# Patient Record
Sex: MALE | URBAN - METROPOLITAN AREA
[De-identification: names, ages, dates, MRNs, and addresses within clinical notes are randomized per-mention and may not be internally consistent; named-entity substitution may affect disease eponyms.]

---

## 2017-07-25 ENCOUNTER — APPOINTMENT (RX ONLY)
Dept: URBAN - METROPOLITAN AREA CLINIC 131 | Facility: CLINIC | Age: 72
Setting detail: DERMATOLOGY
End: 2017-07-25

## 2017-07-25 DIAGNOSIS — B00.1 HERPESVIRAL VESICULAR DERMATITIS: ICD-10-CM

## 2017-07-25 PROCEDURE — 99212 OFFICE O/P EST SF 10 MIN: CPT

## 2017-07-25 PROCEDURE — ? PRESCRIPTION

## 2017-07-25 RX ORDER — VALACYCLOVIR HYDROCHLORIDE 1 G/1
TABLET, FILM COATED ORAL
Qty: 8 | Refills: 0

## 2017-07-25 ASSESSMENT — LOCATION SIMPLE DESCRIPTION DERM: LOCATION SIMPLE: LEFT THIGH

## 2017-07-25 ASSESSMENT — LOCATION ZONE DERM: LOCATION ZONE: LEG

## 2017-07-25 ASSESSMENT — LOCATION DETAILED DESCRIPTION DERM: LOCATION DETAILED: LEFT ANTERIOR PROXIMAL THIGH

## 2017-07-25 NOTE — HPI: RASH
How Severe Is Your Rash?: mild
Is This A New Presentation, Or A Follow-Up?: Rash
Additional History: He states he gets a tingling or burning sensation prior to blisters appearing.

## 2017-07-25 NOTE — PROCEDURE: MIPS QUALITY
Quality 130: Documentation Of Current Medications In The Medical Record: Current Medications Documented
Quality 47: Advance Care Plan: Advance Care Planning discussed and documented in the medical record; patient did not wish or was not able to name a surrogate decision maker or provide an advance care plan.
Quality 226: Preventive Care And Screening: Tobacco Use: Screening And Cessation Intervention: Patient screened for tobacco and is an ex-smoker
Detail Level: Detailed
Quality 111:Pneumonia Vaccination Status For Older Adults: Pneumococcal Vaccination not Administered or Previously Received, Reason not Otherwise Specified
Quality 131: Pain Assessment And Follow-Up: Pain assessment using a standardized tool is documented as negative, no follow-up plan required
Quality 110: Preventive Care And Screening: Influenza Immunization: Influenza Immunization previously received during influenza season

## 2018-01-16 ENCOUNTER — APPOINTMENT (RX ONLY)
Dept: URBAN - METROPOLITAN AREA CLINIC 131 | Facility: CLINIC | Age: 73
Setting detail: DERMATOLOGY
End: 2018-01-16

## 2018-01-16 DIAGNOSIS — L82.0 INFLAMED SEBORRHEIC KERATOSIS: ICD-10-CM

## 2018-01-16 DIAGNOSIS — T300 BLISTERS, EPIDERMAL LOSS [SECOND DEGREE], UNSPECIFIED SITE: ICD-10-CM

## 2018-01-16 DIAGNOSIS — L81.4 OTHER MELANIN HYPERPIGMENTATION: ICD-10-CM

## 2018-01-16 PROBLEM — T21.23XA BURN OF SECOND DEGREE OF UPPER BACK, INITIAL ENCOUNTER: Status: ACTIVE | Noted: 2018-01-16

## 2018-01-16 PROBLEM — D48.5 NEOPLASM OF UNCERTAIN BEHAVIOR OF SKIN: Status: ACTIVE | Noted: 2018-01-16

## 2018-01-16 PROCEDURE — 17110 DESTRUCTION B9 LES UP TO 14: CPT

## 2018-01-16 PROCEDURE — 11100: CPT | Mod: 59

## 2018-01-16 PROCEDURE — 99214 OFFICE O/P EST MOD 30 MIN: CPT | Mod: 25

## 2018-01-16 PROCEDURE — ? COUNSELING

## 2018-01-16 PROCEDURE — ? BIOPSY BY SHAVE METHOD

## 2018-01-16 PROCEDURE — ? LIQUID NITROGEN

## 2018-01-16 ASSESSMENT — LOCATION ZONE DERM
LOCATION ZONE: ARM
LOCATION ZONE: TRUNK

## 2018-01-16 ASSESSMENT — LOCATION SIMPLE DESCRIPTION DERM
LOCATION SIMPLE: RIGHT UPPER BACK
LOCATION SIMPLE: LEFT FOREARM
LOCATION SIMPLE: UPPER BACK

## 2018-01-16 ASSESSMENT — LOCATION DETAILED DESCRIPTION DERM
LOCATION DETAILED: RIGHT MID-UPPER BACK
LOCATION DETAILED: LEFT DISTAL DORSAL FOREARM
LOCATION DETAILED: SUPERIOR THORACIC SPINE

## 2018-01-16 NOTE — PROCEDURE: LIQUID NITROGEN
Include Z78.9 (Other Specified Conditions Influencing Health Status) As An Associated Diagnosis?: No
Number Of Freeze-Thaw Cycles: 2 freeze-thaw cycles
Consent: The patient's consent was obtained including but not limited to risks of crusting, scabbing, blistering, scarring, darker or lighter pigmentary change, recurrence, incomplete removal and infection.
Post-Care Instructions: I reviewed with the patient in detail post-care instructions. Patient is to wear sunprotection, and avoid picking at any of the treated lesions. Pt may apply Vaseline to crusted or scabbing areas.  PHOTO WAS TAKEN
Medical Necessity Information: It is in your best interest to select a reason for this procedure from the list below. All of these items fulfill various CMS LCD requirements except the new and changing color options.
Detail Level: Simple
Medical Necessity Clause: This procedure was medically necessary because the lesions that were treated were:there were actinic keratosis changes.

## 2018-01-16 NOTE — PROCEDURE: BIOPSY BY SHAVE METHOD
Additional Anesthesia Volume In Cc (Will Not Render If 0): 0
Type Of Destruction Used: Curettage
Render Post-Care Instructions In Note?: yes
Lab Facility: [address]
Destruction After The Procedure: No
Notification Instructions: CALL WITH RESULTS
Anesthesia Type: 2% lidocaine with epinephrine
Detail Level: Simple
Electrodesiccation Text: The wound bed was treated with electrodesiccation after the biopsy was performed.
Biopsy Type: H and E
Size Of Lesion In Cm: 0.9
Silver Nitrate Text: The wound bed was treated with silver nitrate after the biopsy was performed.
Billing Type: [employer]
Post-Care Instructions: I reviewed with the patient in detail post-care instructions. Patient is to keep the biopsy site dry overnight, and then apply bacitracin twice daily until healed. Patient may apply hydrogen peroxide soaks to remove any crusting.
Dressing: pressure dressing
Lab: [facility]
Hemostasis: TCA 25%
Consent: Written consent was obtained and risks were reviewed including but not limited to scarring, infection, bleeding, scabbing, incomplete removal, nerve damage and allergy to anesthesia.
Electrodesiccation And Curettage Text: The wound bed was treated with electrodesiccation and curettage after the biopsy was performed.
Biopsy Method: 10 blade
Body Location Override (Optional - Billing Will Still Be Based On Selected Body Map Location If Applicable): right upper arm
Anesthesia Volume In Cc (Will Not Render If 0): 1
Wound Care: Vaseline
Cryotherapy Text: The wound bed was treated with cryotherapy after the biopsy was performed.

## 2018-03-12 ENCOUNTER — NEW PATIENT COMPREHENSIVE (OUTPATIENT)
Dept: URBAN - METROPOLITAN AREA CLINIC 35 | Facility: CLINIC | Age: 73
End: 2018-03-12

## 2018-03-12 DIAGNOSIS — H04.123: ICD-10-CM

## 2018-03-12 DIAGNOSIS — H25.812: ICD-10-CM

## 2018-03-12 DIAGNOSIS — H25.811: ICD-10-CM

## 2018-03-12 PROCEDURE — 92025-2 CORNEAL TOPOGRAPHY, PT

## 2018-03-12 PROCEDURE — 92004 COMPRE OPH EXAM NEW PT 1/>: CPT

## 2018-03-12 PROCEDURE — 92136TC INTERFEROMETRY - TECHNICAL COMPONENT

## 2018-03-12 PROCEDURE — 92015 DETERMINE REFRACTIVE STATE: CPT

## 2018-03-12 ASSESSMENT — VISUAL ACUITY
OS_CC: J3
OD_CC: 20/50-1
OS_SC: J<12
OS_CC: 20/50+2
OD_SC: 20/400
OD_BAT: 20/<400 W/ MR
OD_CC: J5
OD_SC: J<12

## 2018-03-12 ASSESSMENT — TONOMETRY
OD_IOP_MMHG: 17
OS_IOP_MMHG: 17

## 2018-03-29 ENCOUNTER — SURGERY/PROCEDURE (OUTPATIENT)
Dept: URBAN - METROPOLITAN AREA CLINIC 35 | Facility: CLINIC | Age: 73
End: 2018-03-29

## 2018-03-29 DIAGNOSIS — H25.812: ICD-10-CM

## 2018-03-29 PROCEDURE — 66999LNSR LENSAR LASER FOR CAT SX

## 2018-03-29 PROCEDURE — 66984CV REMOVE CATARACT, INSERT LENS, CUSTOM VISION

## 2018-03-30 ENCOUNTER — POST-OP (OUTPATIENT)
Dept: URBAN - METROPOLITAN AREA CLINIC 35 | Facility: CLINIC | Age: 73
End: 2018-03-30

## 2018-03-30 DIAGNOSIS — Z96.1: ICD-10-CM

## 2018-03-30 PROCEDURE — 1036F TOBACCO NON-USER: CPT

## 2018-03-30 PROCEDURE — G9744 PT NOT ELI D/T ACT DIG HTN: HCPCS

## 2018-03-30 PROCEDURE — G8427 DOCREV CUR MEDS BY ELIG CLIN: HCPCS

## 2018-03-30 PROCEDURE — G8482 FLU IMMUNIZE ORDER/ADMIN: HCPCS

## 2018-03-30 PROCEDURE — G8756 NO BP MEASURE DOC: HCPCS

## 2018-03-30 PROCEDURE — 4040F PNEUMOC VAC/ADMIN/RCVD: CPT

## 2018-03-30 ASSESSMENT — TONOMETRY: OS_IOP_MMHG: 12

## 2018-03-30 ASSESSMENT — VISUAL ACUITY: OS_SC: 20/50

## 2018-04-04 ENCOUNTER — POST OP/EVAL OF SECOND EYE (OUTPATIENT)
Dept: URBAN - METROPOLITAN AREA CLINIC 35 | Facility: CLINIC | Age: 73
End: 2018-04-04

## 2018-04-04 DIAGNOSIS — H04.123: ICD-10-CM

## 2018-04-04 DIAGNOSIS — Z96.1: ICD-10-CM

## 2018-04-04 DIAGNOSIS — H25.811: ICD-10-CM

## 2018-04-04 PROCEDURE — 99213 OFFICE O/P EST LOW 20 MIN: CPT

## 2018-04-04 ASSESSMENT — TONOMETRY
OD_IOP_MMHG: 16
OS_IOP_MMHG: 18

## 2018-04-04 ASSESSMENT — VISUAL ACUITY
OD_SC: 20/400
OD_BAT: 20/<400 W/ MR
OS_SC: 20/60

## 2018-04-05 ENCOUNTER — SURGERY/PROCEDURE (OUTPATIENT)
Dept: URBAN - METROPOLITAN AREA CLINIC 35 | Facility: CLINIC | Age: 73
End: 2018-04-05

## 2018-04-05 DIAGNOSIS — H25.811: ICD-10-CM

## 2018-04-05 PROCEDURE — 66984CV REMOVE CATARACT, INSERT LENS, CUSTOM VISION

## 2018-04-05 PROCEDURE — 66999LNSR LENSAR LASER FOR CAT SX

## 2018-04-06 ENCOUNTER — CATARACT POST-OP 1-DAY (OUTPATIENT)
Dept: URBAN - METROPOLITAN AREA CLINIC 39 | Facility: CLINIC | Age: 73
End: 2018-04-06

## 2018-04-06 DIAGNOSIS — Z96.1: ICD-10-CM

## 2018-04-06 RX ORDER — SODIUM CHLORIDE 50 MG/G: 1 OINTMENT OPHTHALMIC EVERY EVENING

## 2018-04-06 RX ORDER — BRIMONIDINE TARTRATE, TIMOLOL MALEATE 2; 5 MG/ML; MG/ML: 1 SOLUTION/ DROPS OPHTHALMIC TWICE A DAY

## 2018-04-06 ASSESSMENT — VISUAL ACUITY: OD_SC: CF 3FT

## 2018-04-06 ASSESSMENT — TONOMETRY: OD_IOP_MMHG: 20

## 2018-04-16 ENCOUNTER — POST-OP CATARACT (OUTPATIENT)
Dept: URBAN - METROPOLITAN AREA CLINIC 35 | Facility: CLINIC | Age: 73
End: 2018-04-16

## 2018-04-16 DIAGNOSIS — Z96.1: ICD-10-CM

## 2018-04-16 ASSESSMENT — TONOMETRY
OD_IOP_MMHG: 10
OS_IOP_MMHG: 12

## 2018-04-16 ASSESSMENT — VISUAL ACUITY
OS_SC: 20/50
OD_PH: 20/60
OD_SC: 20/60
OS_PH: 20/30

## 2018-04-30 ENCOUNTER — POST-OP (OUTPATIENT)
Dept: URBAN - METROPOLITAN AREA CLINIC 35 | Facility: CLINIC | Age: 73
End: 2018-04-30

## 2018-04-30 DIAGNOSIS — Z96.1: ICD-10-CM

## 2018-04-30 ASSESSMENT — TONOMETRY
OS_IOP_MMHG: 15
OD_IOP_MMHG: 14

## 2018-04-30 ASSESSMENT — VISUAL ACUITY
OS_SC: 20/40
OD_SC: 20/40

## 2018-05-30 ENCOUNTER — CONSULT (OUTPATIENT)
Dept: URBAN - METROPOLITAN AREA CLINIC 35 | Facility: CLINIC | Age: 73
End: 2018-05-30

## 2018-05-30 DIAGNOSIS — H04.123: ICD-10-CM

## 2018-05-30 DIAGNOSIS — H26.492: ICD-10-CM

## 2018-05-30 DIAGNOSIS — Z98.890: ICD-10-CM

## 2018-05-30 DIAGNOSIS — H33.302: ICD-10-CM

## 2018-05-30 DIAGNOSIS — H26.491: ICD-10-CM

## 2018-05-30 PROCEDURE — 99024 POSTOP FOLLOW-UP VISIT: CPT

## 2018-05-30 RX ORDER — DUREZOL 0.5 MG/ML: 1 EMULSION OPHTHALMIC TWICE A DAY

## 2018-05-30 RX ORDER — NEPAFENAC 3 MG/ML: 1 SUSPENSION/ DROPS OPHTHALMIC ONCE A DAY

## 2018-05-30 ASSESSMENT — TONOMETRY
OD_IOP_MMHG: 13
OS_IOP_MMHG: 13

## 2018-05-30 ASSESSMENT — VISUAL ACUITY
OS_SC: 20/70
OD_SC: 20/50-2

## 2018-06-13 ENCOUNTER — POST-OP (OUTPATIENT)
Dept: URBAN - METROPOLITAN AREA CLINIC 35 | Facility: CLINIC | Age: 73
End: 2018-06-13

## 2018-06-13 DIAGNOSIS — H26.492: ICD-10-CM

## 2018-06-13 DIAGNOSIS — H20.9: ICD-10-CM

## 2018-06-13 DIAGNOSIS — H26.491: ICD-10-CM

## 2018-06-13 DIAGNOSIS — Z96.1: ICD-10-CM

## 2018-06-13 ASSESSMENT — VISUAL ACUITY
OD_SC: 20/40-1
OS_SC: 20/80-1

## 2018-06-13 ASSESSMENT — TONOMETRY
OS_IOP_MMHG: 12
OD_IOP_MMHG: 12

## 2018-07-19 ENCOUNTER — SURGERY/PROCEDURE (OUTPATIENT)
Dept: URBAN - METROPOLITAN AREA SURGERY 14 | Facility: SURGERY | Age: 73
End: 2018-07-19

## 2018-07-19 DIAGNOSIS — H26.491: ICD-10-CM

## 2018-07-19 DIAGNOSIS — H26.492: ICD-10-CM

## 2018-07-19 PROCEDURE — 6682150 YAG CAPSULOTOMY

## 2018-07-30 ENCOUNTER — POST-OP (OUTPATIENT)
Dept: URBAN - METROPOLITAN AREA CLINIC 35 | Facility: CLINIC | Age: 73
End: 2018-07-30

## 2018-07-30 DIAGNOSIS — H20.9: ICD-10-CM

## 2018-07-30 DIAGNOSIS — Z98.890: ICD-10-CM

## 2018-07-30 PROCEDURE — 99024 POSTOP FOLLOW-UP VISIT: CPT

## 2018-07-30 ASSESSMENT — TONOMETRY
OD_IOP_MMHG: 10
OS_IOP_MMHG: 12

## 2018-07-30 ASSESSMENT — VISUAL ACUITY
OS_SC: 20/60
OD_SC: 20/50-2

## 2018-09-11 ENCOUNTER — TECH ONLY (OUTPATIENT)
Dept: URBAN - METROPOLITAN AREA CLINIC 39 | Facility: CLINIC | Age: 73
End: 2018-09-11

## 2018-09-11 DIAGNOSIS — Z98.890: ICD-10-CM

## 2018-09-11 DIAGNOSIS — H20.9: ICD-10-CM

## 2018-09-11 PROCEDURE — 99211T TECH SERVICE

## 2018-09-27 ENCOUNTER — SURGERY/PROCEDURE (OUTPATIENT)
Dept: URBAN - METROPOLITAN AREA CLINIC 35 | Facility: CLINIC | Age: 73
End: 2018-09-27

## 2018-09-27 DIAGNOSIS — H52.7: ICD-10-CM

## 2018-09-27 PROCEDURE — 66999SPP EPI LASEK S/P ADVANCED / CUSTOM < 12 MONTHS

## 2018-09-28 ENCOUNTER — 1 DAY LASIK POST OP (OUTPATIENT)
Dept: URBAN - METROPOLITAN AREA CLINIC 35 | Facility: CLINIC | Age: 73
End: 2018-09-28

## 2018-09-28 DIAGNOSIS — Z98.890: ICD-10-CM

## 2018-09-28 PROCEDURE — 99024 POSTOP FOLLOW-UP VISIT: CPT

## 2018-09-28 ASSESSMENT — VISUAL ACUITY
OS_SC: 20/30-1
OD_SC: 20/30+1

## 2018-10-03 ENCOUNTER — LASIK POST OP (OUTPATIENT)
Dept: URBAN - METROPOLITAN AREA CLINIC 35 | Facility: CLINIC | Age: 73
End: 2018-10-03

## 2018-10-03 DIAGNOSIS — Z98.890: ICD-10-CM

## 2018-10-03 PROCEDURE — 99024 POSTOP FOLLOW-UP VISIT: CPT

## 2018-10-03 ASSESSMENT — VISUAL ACUITY
OS_SC: 20/70
OD_SC: 20/30
OD_SC: J4
OS_SC: J3

## 2018-11-07 ENCOUNTER — LASIK POST OP (OUTPATIENT)
Dept: URBAN - METROPOLITAN AREA CLINIC 35 | Facility: CLINIC | Age: 73
End: 2018-11-07

## 2018-11-07 DIAGNOSIS — Z98.890: ICD-10-CM

## 2018-11-07 PROCEDURE — 6699955 NON-COMANAGED LASIK PO

## 2018-11-07 ASSESSMENT — VISUAL ACUITY
OD_SC: J10
OS_SC: J6
OD_SC: 20/30
OS_SC: 20/40-1
OS_PH: 20/40

## 2018-11-07 ASSESSMENT — TONOMETRY
OD_IOP_MMHG: 14
OS_IOP_MMHG: 13

## 2019-01-18 ENCOUNTER — APPOINTMENT (RX ONLY)
Dept: URBAN - METROPOLITAN AREA CLINIC 131 | Facility: CLINIC | Age: 74
Setting detail: DERMATOLOGY
End: 2019-01-18

## 2019-01-18 DIAGNOSIS — L57.0 ACTINIC KERATOSIS: ICD-10-CM

## 2019-01-18 DIAGNOSIS — D485 NEOPLASM OF UNCERTAIN BEHAVIOR OF SKIN: ICD-10-CM

## 2019-01-18 DIAGNOSIS — L29.89 OTHER PRURITUS: ICD-10-CM

## 2019-01-18 PROBLEM — L29.8 OTHER PRURITUS: Status: ACTIVE | Noted: 2019-01-18

## 2019-01-18 PROBLEM — D48.5 NEOPLASM OF UNCERTAIN BEHAVIOR OF SKIN: Status: ACTIVE | Noted: 2019-01-18

## 2019-01-18 PROCEDURE — 99214 OFFICE O/P EST MOD 30 MIN: CPT | Mod: 25

## 2019-01-18 PROCEDURE — 17000 DESTRUCT PREMALG LESION: CPT

## 2019-01-18 PROCEDURE — ? COUNSELING

## 2019-01-18 PROCEDURE — 17003 DESTRUCT PREMALG LES 2-14: CPT

## 2019-01-18 PROCEDURE — ? LIQUID NITROGEN

## 2019-01-18 PROCEDURE — ? SHAVE REMOVAL

## 2019-01-18 PROCEDURE — 11310 SHAVE SKIN LESION 0.5 CM/<: CPT | Mod: 59

## 2019-01-18 ASSESSMENT — LOCATION DETAILED DESCRIPTION DERM
LOCATION DETAILED: RIGHT FOREHEAD
LOCATION DETAILED: RIGHT MID-UPPER BACK
LOCATION DETAILED: RIGHT DISTAL DORSAL FOREARM
LOCATION DETAILED: LEFT PROXIMAL DORSAL FOREARM
LOCATION DETAILED: RIGHT RADIAL DORSAL HAND
LOCATION DETAILED: RIGHT LATERAL BUCCAL CHEEK
LOCATION DETAILED: RIGHT INFERIOR CENTRAL MALAR CHEEK

## 2019-01-18 ASSESSMENT — LOCATION ZONE DERM
LOCATION ZONE: TRUNK
LOCATION ZONE: ARM
LOCATION ZONE: HAND
LOCATION ZONE: FACE

## 2019-01-18 ASSESSMENT — LOCATION SIMPLE DESCRIPTION DERM
LOCATION SIMPLE: RIGHT HAND
LOCATION SIMPLE: RIGHT FOREHEAD
LOCATION SIMPLE: LEFT FOREARM
LOCATION SIMPLE: RIGHT FOREARM
LOCATION SIMPLE: RIGHT UPPER BACK
LOCATION SIMPLE: RIGHT CHEEK

## 2019-01-18 NOTE — PROCEDURE: LIQUID NITROGEN
Number Of Freeze-Thaw Cycles: 1 freeze-thaw cycle
Consent: The patient's consent was obtained including but not limited to risks of crusting, scabbing, blistering, scarring, darker or lighter pigmentary change, recurrence, incomplete removal and infection.
Post-Care Instructions: I reviewed with the patient in detail post-care instructions. Patient is to wear sunprotection, and avoid picking at any of the treated lesions. Pt may apply Vaseline to crusted or scabbing areas.
Detail Level: Simple
Render Post-Care Instructions In Note?: yes
Duration Of Freeze Thaw-Cycle (Seconds): 5

## 2019-01-18 NOTE — PROCEDURE: SHAVE REMOVAL
Lab: [facility]
Hemostasis: Pressure
Notification Instructions: Patient will be notified of biopsy results. However, patient instructed to call the office if not contacted within 2 weeks.
Size Of Lesion In Cm (Required): 0.5
Bill 74570 For Specimen Handling/Conveyance To Laboratory?: no
Consent was obtained from the patient. The risks and benefits to therapy were discussed in detail. Specifically, the risks of infection, scarring, bleeding, prolonged wound healing, incomplete removal, allergy to anesthesia, nerve injury and recurrence were addressed. Prior to the procedure, the treatment site was clearly identified and confirmed by the patient. All components of Universal Protocol/PAUSE Rule completed.
Billing Type: [employer]
Lab Facility: [address]
X Size Of Lesion In Cm (Optional): 0
Wound Care: Vaseline
Was A Bandage Applied: Yes
Anesthesia Volume In Cc: 1
Path Notes (To The Dermatopathologist): Please check margins.
Anesthesia Type: 2% lidocaine with epinephrine and a 1:10 solution of 8.4% sodium bicarbonate
Size Of Margin In Cm (Margins Are Not Added To Billing Dimensions): 0.2
Medical Necessity Clause: This procedure was medically necessary because the lesion that was treated was:
Biopsy Method: 15 blade
Post-Care Instructions: I reviewed with the patient in detail post-care instructions. Patient is to keep the biopsy site dry overnight, and then apply bacitracin twice daily until healed. Patient may apply hydrogen peroxide soaks to remove any crusting.
Body Location Override (Optional - Billing Will Still Be Based On Selected Body Map Location If Applicable): right jawline
Medical Necessity Information: It is in your best interest to select a reason for this procedure from the list below. All of these items fulfill various CMS LCD requirements except the new and changing color options.
Detail Level: Detailed

## 2019-08-26 NOTE — PATIENT DISCUSSION
CATARACT, OU - VISUALLY SIGNIFICANT. SCHEDULE PHACO WITH IOL OD FIRST SET FOR DISTANCE THEN OS SET FOR DISTANCE IF VISUAL SYMPTOMS PERSIST. GLASSES RX GIVEN TO FILL IF DESIRES IN THE EVENT PATIENT DOES NOT PROCEED WITH SURGERY. DISCUSSED THE OPTION OF REDUCING ASTIGMATISM AT THE TIME OF SURGERY AND THE RECOMMENDATION FOR AVT. REVIEWED THAT THE PATIENT WILL STILL NEED READING GLASSES AFTER SURGERY FOR ALL NEAR TASKS.

## 2019-09-17 NOTE — PATIENT DISCUSSION
CATARACT, OU - VISUALLY SIGNIFICANT. SCHEDULE PHACO WITH IOL OD SET FOR DISTANCE FIRST THEN OS SET FOR DISTANCE IF VISUAL SYMPTOMS PERSIST.

## 2019-09-25 NOTE — PATIENT DISCUSSION
Continue as directed the right eye for 1 week, then stop. Start in the left eye 2 days prior to surgery and continue for 1 week after.

## 2019-09-25 NOTE — PATIENT DISCUSSION
Continue: Pred Forte (prednisolone acetate): drops,suspension: 1% 1 drop three times a day as directed into right eye 09-

## 2019-09-25 NOTE — PATIENT DISCUSSION
CATARACT, OS: VISUALLY SIGNIFICANT. SCHEDULE PHACO WITH IOL. IOL MASTER REVIEWED AND INTERPRETED ON H&amp;P SHEET FOR OS TODAY. CONSENTS FOR SECOND EYE DISCUSSED WITH PATIENT TODAY. PATIENT UNDERSTANDS AND HAS NO FURTHER QUESTIONS.

## 2019-10-22 NOTE — PATIENT DISCUSSION
Continue: Pred Forte (prednisolone acetate): drops,suspension: 1% 1 drop three times a day as directed into left eye 09-

## 2019-10-22 NOTE — PATIENT DISCUSSION
Post-Op Instructions OS: Pred Forte (Prednisolone) and Prolensa 1 time per day for 1 week, then discontinue.

## 2019-11-04 ENCOUNTER — ESTABLISHED COMPREHENSIVE EXAM (OUTPATIENT)
Dept: URBAN - METROPOLITAN AREA CLINIC 35 | Facility: CLINIC | Age: 74
End: 2019-11-04

## 2019-11-04 DIAGNOSIS — H04.123: ICD-10-CM

## 2019-11-04 PROCEDURE — 92015 DETERMINE REFRACTIVE STATE: CPT

## 2019-11-04 PROCEDURE — 92014 COMPRE OPH EXAM EST PT 1/>: CPT

## 2019-11-04 ASSESSMENT — VISUAL ACUITY
OS_SC: J12
OD_SC: J12
OD_CC: J1
OD_SC: 20/20-1
OS_CC: J1
OS_SC: 20/20-1

## 2019-11-04 ASSESSMENT — TONOMETRY
OS_IOP_MMHG: 14
OD_IOP_MMHG: 14

## 2019-12-04 ENCOUNTER — CONSULT (OUTPATIENT)
Dept: URBAN - METROPOLITAN AREA CLINIC 39 | Facility: CLINIC | Age: 74
End: 2019-12-04

## 2019-12-04 DIAGNOSIS — H02.831: ICD-10-CM

## 2019-12-04 DIAGNOSIS — H02.834: ICD-10-CM

## 2019-12-04 PROCEDURE — 92285 EXTERNAL OCULAR PHOTOGRAPHY: CPT

## 2019-12-04 PROCEDURE — 99213 OFFICE O/P EST LOW 20 MIN: CPT

## 2019-12-04 ASSESSMENT — VISUAL ACUITY
OS_SC: 20/20-1
OD_SC: 20/20

## 2019-12-09 ENCOUNTER — TECH ONLY (OUTPATIENT)
Dept: URBAN - METROPOLITAN AREA CLINIC 39 | Facility: CLINIC | Age: 74
End: 2019-12-09

## 2019-12-09 DIAGNOSIS — H02.834: ICD-10-CM

## 2019-12-09 DIAGNOSIS — H02.831: ICD-10-CM

## 2019-12-09 PROCEDURE — 99211T TECH SERVICE

## 2019-12-09 PROCEDURE — 92082 INTERMEDIATE VISUAL FIELD XM: CPT

## 2020-01-21 ENCOUNTER — RX ONLY (OUTPATIENT)
Age: 75
Setting detail: RX ONLY
End: 2020-01-21

## 2020-01-21 ENCOUNTER — APPOINTMENT (RX ONLY)
Dept: URBAN - METROPOLITAN AREA CLINIC 131 | Facility: CLINIC | Age: 75
Setting detail: DERMATOLOGY
End: 2020-01-21

## 2020-01-21 DIAGNOSIS — L57.0 ACTINIC KERATOSIS: ICD-10-CM

## 2020-01-21 DIAGNOSIS — B35.4 TINEA CORPORIS: ICD-10-CM

## 2020-01-21 DIAGNOSIS — D485 NEOPLASM OF UNCERTAIN BEHAVIOR OF SKIN: ICD-10-CM

## 2020-01-21 DIAGNOSIS — L82.1 OTHER SEBORRHEIC KERATOSIS: ICD-10-CM

## 2020-01-21 DIAGNOSIS — B00.1 HERPESVIRAL VESICULAR DERMATITIS: ICD-10-CM

## 2020-01-21 DIAGNOSIS — D22 MELANOCYTIC NEVI: ICD-10-CM

## 2020-01-21 PROBLEM — D22.5 MELANOCYTIC NEVI OF TRUNK: Status: ACTIVE | Noted: 2020-01-21

## 2020-01-21 PROBLEM — D48.5 NEOPLASM OF UNCERTAIN BEHAVIOR OF SKIN: Status: ACTIVE | Noted: 2020-01-21

## 2020-01-21 PROCEDURE — ? PRESCRIPTION MEDICATION MANAGEMENT

## 2020-01-21 PROCEDURE — ? PREMALIGNANT DESTRUCTION

## 2020-01-21 PROCEDURE — ? PRESCRIPTION

## 2020-01-21 PROCEDURE — 11102 TANGNTL BX SKIN SINGLE LES: CPT

## 2020-01-21 PROCEDURE — 17000 DESTRUCT PREMALG LESION: CPT | Mod: 59

## 2020-01-21 PROCEDURE — ? BIOPSY BY SHAVE METHOD

## 2020-01-21 PROCEDURE — 99214 OFFICE O/P EST MOD 30 MIN: CPT | Mod: 25

## 2020-01-21 RX ORDER — ECONAZOLE NITRATE 10 MG/G
CREAM TOPICAL BID
Qty: 1 | Refills: 1 | Status: ERX

## 2020-01-21 ASSESSMENT — LOCATION ZONE DERM
LOCATION ZONE: LEG
LOCATION ZONE: NECK
LOCATION ZONE: TRUNK
LOCATION ZONE: FACE

## 2020-01-21 ASSESSMENT — LOCATION SIMPLE DESCRIPTION DERM
LOCATION SIMPLE: RIGHT CLAVICULAR SKIN
LOCATION SIMPLE: RIGHT THIGH
LOCATION SIMPLE: RIGHT CHEEK
LOCATION SIMPLE: LEFT THIGH
LOCATION SIMPLE: LEFT UPPER BACK
LOCATION SIMPLE: LEFT ANTERIOR NECK

## 2020-01-21 ASSESSMENT — LOCATION DETAILED DESCRIPTION DERM
LOCATION DETAILED: RIGHT CLAVICULAR SKIN
LOCATION DETAILED: LEFT ANTERIOR PROXIMAL THIGH
LOCATION DETAILED: LEFT INFERIOR LATERAL NECK
LOCATION DETAILED: LEFT SUPERIOR MEDIAL UPPER BACK
LOCATION DETAILED: RIGHT ANTERIOR PROXIMAL THIGH
LOCATION DETAILED: RIGHT SUPERIOR CENTRAL MALAR CHEEK

## 2020-01-21 NOTE — PROCEDURE: PRESCRIPTION MEDICATION MANAGEMENT
Continue Regimen: Valtrex at first onset (refills given)
Detail Level: Simple
Render In Strict Bullet Format?: No

## 2020-01-21 NOTE — PROCEDURE: BIOPSY BY SHAVE METHOD
Body Location Override (Optional - Billing Will Still Be Based On Selected Body Map Location If Applicable): left mid neck
Detail Level: Simple
Depth Of Biopsy: dermis
Was A Bandage Applied: Yes
Size Of Lesion In Cm: 0.6
Biopsy Type: H and E
Biopsy Method: 15 blade
Anesthesia Type: 2% lidocaine with epinephrine
Anesthesia Volume In Cc (Will Not Render If 0): 0.5
Additional Anesthesia Volume In Cc (Will Not Render If 0): 0
Hemostasis: Electrocautery
Wound Care: Vaseline
Dressing: pressure dressing
Destruction After The Procedure: No
Type Of Destruction Used: Curettage
Curettage Text: The edges were curetted to assure complete removal
Cryotherapy Text: The wound bed was treated with cryotherapy after the biopsy was performed.
Electrodesiccation Text: The wound bed was treated with electrodesiccation after the biopsy was performed.
Electrodesiccation And Curettage Text: The wound bed was treated with electrodesiccation and curettage after the biopsy was performed.
Silver Nitrate Text: The wound bed was treated with silver nitrate after the biopsy was performed.
Lab: [facility]
Lab Facility: [address]
Consent: Written consent was obtained and risks were reviewed including but not limited to scarring, infection, bleeding, scabbing, incomplete removal, nerve damage and allergy to anesthesia.
Post-Care Instructions: I reviewed with the patient in detail post-care instructions. Patient is to keep the biopsy site dry overnight, and then apply bacitracin twice daily until healed. Patient may apply hydrogen peroxide soaks to remove any crusting.
Billing Type: [employer]

## 2020-01-21 NOTE — PROCEDURE: PREMALIGNANT DESTRUCTION
Post-Care Instructions: I reviewed with the patient in detail post-care instructions. Patient is to wear sunprotection, and avoid picking at any of the treated lesions. Pt may apply Vaseline to crusted or scabbing areas.
Anesthesia Volume In Cc: 0
Detail Level: Simple
Render Note In Bullet Format When Appropriate: No
Consent: The patient's consent was obtained including but not limited to risks of crusting, scabbing, blistering, scarring, darker or lighter pigmentary change, recurrence, incomplete removal and infection.
Method: TCA 50%

## 2020-01-21 NOTE — PROCEDURE: MIPS QUALITY
Additional Notes: Patient unaware of medications/dosing to bring list at next visit.
Detail Level: Detailed
Quality 111:Pneumonia Vaccination Status For Older Adults: Pneumococcal Vaccination Previously Received
Quality 130: Documentation Of Current Medications In The Medical Record: Current Medications Documented

## 2020-02-11 ENCOUNTER — APPOINTMENT (RX ONLY)
Dept: URBAN - METROPOLITAN AREA CLINIC 106 | Facility: CLINIC | Age: 75
Setting detail: DERMATOLOGY
End: 2020-02-11

## 2020-02-11 DIAGNOSIS — L57.0 ACTINIC KERATOSIS: ICD-10-CM

## 2020-02-11 PROCEDURE — ? LIQUID NITROGEN

## 2020-02-11 PROCEDURE — 17000 DESTRUCT PREMALG LESION: CPT

## 2020-02-11 ASSESSMENT — LOCATION SIMPLE DESCRIPTION DERM: LOCATION SIMPLE: LEFT ANTERIOR NECK

## 2020-02-11 ASSESSMENT — LOCATION DETAILED DESCRIPTION DERM: LOCATION DETAILED: LEFT CLAVICULAR NECK

## 2020-02-11 ASSESSMENT — LOCATION ZONE DERM: LOCATION ZONE: NECK

## 2020-02-11 NOTE — PROCEDURE: MIPS QUALITY
Additional Notes: Unsure of dosing frequency of meds
Detail Level: Detailed
Quality 111:Pneumonia Vaccination Status For Older Adults: Pneumococcal Vaccination Previously Received

## 2020-02-11 NOTE — PROCEDURE: LIQUID NITROGEN
Duration Of Freeze Thaw-Cycle (Seconds): 5
Consent: The patient's consent was obtained including but not limited to risks of crusting, scabbing, blistering, scarring, darker or lighter pigmentary change, recurrence, incomplete removal and infection.
Detail Level: Simple
Render Post-Care Instructions In Note?: yes
Number Of Freeze-Thaw Cycles: 1 freeze-thaw cycle
Post-Care Instructions: I reviewed with the patient in detail post-care instructions. Patient is to wear sunprotection, and avoid picking at any of the treated lesions. Pt may apply Vaseline to crusted or scabbing areas.
Render Note In Bullet Format When Appropriate: No

## 2020-03-18 ENCOUNTER — PRE-OP/H&P (OUTPATIENT)
Dept: URBAN - METROPOLITAN AREA CLINIC 39 | Facility: CLINIC | Age: 75
End: 2020-03-18

## 2020-03-18 DIAGNOSIS — H02.831: ICD-10-CM

## 2020-03-18 DIAGNOSIS — H02.835: ICD-10-CM

## 2020-03-18 DIAGNOSIS — H02.834: ICD-10-CM

## 2020-03-18 DIAGNOSIS — H18.59: ICD-10-CM

## 2020-03-18 DIAGNOSIS — H02.832: ICD-10-CM

## 2020-03-18 PROCEDURE — 99211HP H&P OFFICE/OUTPATIENT VISIT, EST

## 2020-05-27 ENCOUNTER — PRE-OP/H&P (OUTPATIENT)
Dept: URBAN - METROPOLITAN AREA CLINIC 39 | Facility: CLINIC | Age: 75
End: 2020-05-27

## 2020-05-27 DIAGNOSIS — H02.834: ICD-10-CM

## 2020-05-27 DIAGNOSIS — H02.831: ICD-10-CM

## 2020-05-27 DIAGNOSIS — H02.832: ICD-10-CM

## 2020-05-27 DIAGNOSIS — H18.59: ICD-10-CM

## 2020-05-27 DIAGNOSIS — H02.835: ICD-10-CM

## 2020-05-27 PROCEDURE — 99211HP H&P OFFICE/OUTPATIENT VISIT, EST

## 2020-05-27 RX ORDER — ERYTHROMYCIN 5 MG/G
OINTMENT OPHTHALMIC
Start: 2020-06-02

## 2020-06-02 ENCOUNTER — SURGERY/PROCEDURE (OUTPATIENT)
Dept: URBAN - METROPOLITAN AREA SURGERY 14 | Facility: SURGERY | Age: 75
End: 2020-06-02

## 2020-06-02 ENCOUNTER — PRE-OP/H&P (OUTPATIENT)
Dept: URBAN - METROPOLITAN AREA SURGERY 14 | Facility: SURGERY | Age: 75
End: 2020-06-02

## 2020-06-02 DIAGNOSIS — H02.834: ICD-10-CM

## 2020-06-02 DIAGNOSIS — H18.59: ICD-10-CM

## 2020-06-02 DIAGNOSIS — H02.831: ICD-10-CM

## 2020-06-02 PROCEDURE — 99499 UNLISTED E&M SERVICE: CPT

## 2020-06-02 PROCEDURE — 1582350 UPPER BLEPH PER EYE FUNCTIONAL-BILATERAL

## 2020-06-09 ENCOUNTER — POST-OP (OUTPATIENT)
Dept: URBAN - METROPOLITAN AREA CLINIC 39 | Facility: CLINIC | Age: 75
End: 2020-06-09

## 2020-06-09 DIAGNOSIS — H02.831: ICD-10-CM

## 2020-06-09 DIAGNOSIS — H02.834: ICD-10-CM

## 2020-06-09 DIAGNOSIS — H18.59: ICD-10-CM

## 2020-06-09 PROCEDURE — 99024 POSTOP FOLLOW-UP VISIT: CPT

## 2020-06-09 ASSESSMENT — VISUAL ACUITY
OD_SC: 20/20-1
OS_SC: 20/25

## 2020-11-09 ENCOUNTER — ESTABLISHED COMPREHENSIVE EXAM (OUTPATIENT)
Dept: URBAN - METROPOLITAN AREA CLINIC 35 | Facility: CLINIC | Age: 75
End: 2020-11-09

## 2020-11-09 DIAGNOSIS — H18.599: ICD-10-CM

## 2020-11-09 DIAGNOSIS — H04.123: ICD-10-CM

## 2020-11-09 PROCEDURE — 92014 COMPRE OPH EXAM EST PT 1/>: CPT

## 2020-11-09 ASSESSMENT — VISUAL ACUITY
OS_SC: J12
OD_SC: 20/25
OS_SC: 20/20
OD_SC: J12

## 2020-11-09 ASSESSMENT — TONOMETRY
OD_IOP_MMHG: 17
OS_IOP_MMHG: 16

## 2020-12-15 NOTE — PATIENT DISCUSSION
CARE TEAM INDICATED THAT PT WILL LIKELY BE MEDICALLY STABLE TO DC BACK TO
[facility] TOMORROW. CM NOTIFIED LIAISON AND SHE IS AWARE. UPDATED CLINICAL
FAXED TO [facility]. PT HAD WOUND VAC ADMINISTERED THIS AM. CM SPOKE WITH
PT'S SPOUSE AT BEDSIDE THIS DAY AND SHE EXPRESSED FRUSTRATION WITH STAFF OVER
AT [facility] IN REGARDS TO COMMUNICATION ABOUT PT STATUS CONDITION. CM CONVEYED
THIS CONCERNS TO LIAISON AT FACILITY. CM INDICATED THAT AT THIS TIME CARE TEAM
ARE RECOMMENDING THAT PT RETURN TO [facility] AND NOT TO A SKILLED FACILITY
LIKE [facility] AS HE STILL REQUIRES MORE ACUTE LEVEL OF WC, IV ABX, AND
THERAPY. CM TO REACH OUT TO SON AS WELL. CM TO FOLLOW AS INDICATED WITH DC
PLANNING. Surgery 2nd Eye Counseling: The patient has noticed an improvement in their visual symptoms in the operative eye. The patient complains of decreased vision in the fellow eye when driving. It was explained to the patient that the decision to proceed with cataract surgery in the fellow eye is entirely a separate decision from the surgical eye. All of the same risks, benefits and alternatives ere reviewed with the patient again. The patient does feel the vision in the non-operative eye is limiting their daily activities and elects to proceed with surgery in the cataract eye.

## 2021-02-03 ENCOUNTER — RX ONLY (OUTPATIENT)
Age: 76
Setting detail: RX ONLY
End: 2021-02-03

## 2021-02-03 ENCOUNTER — APPOINTMENT (RX ONLY)
Dept: URBAN - METROPOLITAN AREA CLINIC 131 | Facility: CLINIC | Age: 76
Setting detail: DERMATOLOGY
End: 2021-02-03

## 2021-02-03 VITALS — TEMPERATURE: 98.9 F

## 2021-02-03 DIAGNOSIS — D18.0 HEMANGIOMA: ICD-10-CM

## 2021-02-03 DIAGNOSIS — D22 MELANOCYTIC NEVI: ICD-10-CM

## 2021-02-03 DIAGNOSIS — L82.1 OTHER SEBORRHEIC KERATOSIS: ICD-10-CM

## 2021-02-03 DIAGNOSIS — D485 NEOPLASM OF UNCERTAIN BEHAVIOR OF SKIN: ICD-10-CM

## 2021-02-03 DIAGNOSIS — L71.8 OTHER ROSACEA: ICD-10-CM

## 2021-02-03 DIAGNOSIS — L57.8 OTHER SKIN CHANGES DUE TO CHRONIC EXPOSURE TO NONIONIZING RADIATION: ICD-10-CM

## 2021-02-03 PROBLEM — D48.5 NEOPLASM OF UNCERTAIN BEHAVIOR OF SKIN: Status: ACTIVE | Noted: 2021-02-03

## 2021-02-03 PROBLEM — D22.5 MELANOCYTIC NEVI OF TRUNK: Status: ACTIVE | Noted: 2021-02-03

## 2021-02-03 PROBLEM — D18.01 HEMANGIOMA OF SKIN AND SUBCUTANEOUS TISSUE: Status: ACTIVE | Noted: 2021-02-03

## 2021-02-03 PROCEDURE — 11102 TANGNTL BX SKIN SINGLE LES: CPT

## 2021-02-03 PROCEDURE — ? COUNSELING

## 2021-02-03 PROCEDURE — 99213 OFFICE O/P EST LOW 20 MIN: CPT | Mod: 25

## 2021-02-03 PROCEDURE — ? BIOPSY BY SHAVE METHOD

## 2021-02-03 ASSESSMENT — LOCATION DETAILED DESCRIPTION DERM
LOCATION DETAILED: LEFT SUPERIOR UPPER BACK
LOCATION DETAILED: RIGHT INFERIOR MEDIAL FOREHEAD
LOCATION DETAILED: LEFT LATERAL ABDOMEN
LOCATION DETAILED: EPIGASTRIC SKIN
LOCATION DETAILED: RIGHT CENTRAL MALAR CHEEK
LOCATION DETAILED: PERIUMBILICAL SKIN
LOCATION DETAILED: LEFT CENTRAL MALAR CHEEK

## 2021-02-03 ASSESSMENT — LOCATION SIMPLE DESCRIPTION DERM
LOCATION SIMPLE: LEFT CHEEK
LOCATION SIMPLE: LEFT UPPER BACK
LOCATION SIMPLE: ABDOMEN
LOCATION SIMPLE: RIGHT CHEEK
LOCATION SIMPLE: RIGHT FOREHEAD

## 2021-02-03 ASSESSMENT — LOCATION ZONE DERM
LOCATION ZONE: FACE
LOCATION ZONE: TRUNK

## 2021-02-03 NOTE — PROCEDURE: MIPS QUALITY
Detail Level: Detailed
Additional Notes: Patient will bring updated medication list next visit.
Quality 130: Documentation Of Current Medications In The Medical Record: Eligible clinician attests to documenting in the medical record the patient is not eligible for a current list of medications being obtained, updated, or reviewed by the eligible clinician
Quality 111:Pneumonia Vaccination Status For Older Adults: Pneumococcal Vaccination Previously Received

## 2021-02-03 NOTE — PROCEDURE: BIOPSY BY SHAVE METHOD
Body Location Override (Optional - Billing Will Still Be Based On Selected Body Map Location If Applicable): left upper back
Detail Level: Detailed
Depth Of Biopsy: dermis
Was A Bandage Applied: Yes
Size Of Lesion In Cm: 0.5
X Size Of Lesion In Cm: 0
Biopsy Type: H and E
Biopsy Method: 15 blade
Anesthesia Type: 1% lidocaine without epinephrine
Anesthesia Volume In Cc (Will Not Render If 0): 1
Hemostasis: Rodney's
Wound Care: No ointment
Dressing: bandage
Destruction After The Procedure: No
Type Of Destruction Used: Curettage
Curettage Text: The wound bed was treated with curettage after the biopsy was performed.
Cryotherapy Text: The wound bed was treated with cryotherapy after the biopsy was performed.
Electrodesiccation Text: The wound bed was treated with electrodesiccation after the biopsy was performed.
Electrodesiccation And Curettage Text: The wound bed was treated with electrodesiccation and curettage after the biopsy was performed.
Silver Nitrate Text: The wound bed was treated with silver nitrate after the biopsy was performed.
Lab: [facility]
Lab Facility: [address]
Consent: Written consent was obtained and risks were reviewed including but not limited to scarring, infection, bleeding, scabbing, incomplete removal, nerve damage and allergy to anesthesia.
Post-Care Instructions: I reviewed with the patient in detail post-care instructions. Patient is to keep the biopsy site dry overnight. Patient may apply hydrogen peroxide soaks to remove any crusting.
Notification Instructions: Patient will be notified of biopsy results. However, patient instructed to call the office if not contacted within 2 weeks.
Billing Type: [employer]
Information: Selecting Yes will display possible errors in your note based on the variables you have selected. This validation is only offered as a suggestion for you. PLEASE NOTE THAT THE VALIDATION TEXT WILL BE REMOVED WHEN YOU FINALIZE YOUR NOTE. IF YOU WANT TO FAX A PRELIMINARY NOTE YOU WILL NEED TO TOGGLE THIS TO 'NO' IF YOU DO NOT WANT IT IN YOUR FAXED NOTE.

## 2021-12-06 ENCOUNTER — COMPREHENSIVE EXAM (OUTPATIENT)
Dept: URBAN - METROPOLITAN AREA CLINIC 35 | Facility: CLINIC | Age: 76
End: 2021-12-06

## 2021-12-06 DIAGNOSIS — H04.123: ICD-10-CM

## 2021-12-06 DIAGNOSIS — H02.834: ICD-10-CM

## 2021-12-06 DIAGNOSIS — H02.831: ICD-10-CM

## 2021-12-06 DIAGNOSIS — H18.599: ICD-10-CM

## 2021-12-06 PROCEDURE — 92014 COMPRE OPH EXAM EST PT 1/>: CPT

## 2021-12-06 ASSESSMENT — TONOMETRY
OD_IOP_MMHG: 13
OS_IOP_MMHG: 13

## 2021-12-06 ASSESSMENT — VISUAL ACUITY
OD_SC: J12
OD_SC: 20/20
OS_SC: 20/25+1
OS_CC: J1
OS_SC: J12
OD_CC: J1

## 2021-12-22 NOTE — PATIENT DISCUSSION
Preventive Health Recommendations  Male Ages 40 to 49    Yearly exam:             See your health care provider every year in order to  o   Review health changes.   o   Discuss preventive care.    o   Review your medicines if your doctor has prescribed any.    You should be tested each year for STDs (sexually transmitted diseases) if you re at risk.     Have a cholesterol test every 5 years.     Have a colonoscopy (test for colon cancer) if someone in your family has had colon cancer or polyps before age 50.     After age 45, have a diabetes test (fasting glucose). If you are at risk for diabetes, you should have this test every 3 years.      Talk with your health care provider about whether or not a prostate cancer screening test (PSA) is right for you.    Shots: Get a flu shot each year. Get a tetanus shot every 10 years.     Nutrition:    Eat at least 5 servings of fruits and vegetables daily.     Eat whole-grain bread, whole-wheat pasta and brown rice instead of white grains and rice.     Get adequate Calcium and Vitamin D.     Lifestyle    Exercise for at least 150 minutes a week (30 minutes a day, 5 days a week). This will help you control your weight and prevent disease.     Limit alcohol to one drink per day.     No smoking.     Wear sunscreen to prevent skin cancer.     See your dentist every six months for an exam and cleaning.       -Refractive Error Counseling (Declined): The patient has declined the option to have their refractive error corrected at the time of cataract surgery. It was explained to the patient that there is a high probability that they will need glasses for both distance and near vision. It was also explained to the patient that the decision to not have their refractive error corrected at the time of cataract surgery is a matter of convenience, not quality of vision.

## 2022-04-05 ENCOUNTER — RX ONLY (OUTPATIENT)
Age: 77
Setting detail: RX ONLY
End: 2022-04-05

## 2022-04-05 ENCOUNTER — APPOINTMENT (RX ONLY)
Dept: URBAN - METROPOLITAN AREA CLINIC 131 | Facility: CLINIC | Age: 77
Setting detail: DERMATOLOGY
End: 2022-04-05

## 2022-04-05 DIAGNOSIS — B00.1 HERPESVIRAL VESICULAR DERMATITIS: ICD-10-CM

## 2022-04-05 DIAGNOSIS — L82.0 INFLAMED SEBORRHEIC KERATOSIS: ICD-10-CM

## 2022-04-05 DIAGNOSIS — B35.4 TINEA CORPORIS: ICD-10-CM

## 2022-04-05 PROCEDURE — ? ORDER TESTS

## 2022-04-05 PROCEDURE — ? KOH PREP

## 2022-04-05 PROCEDURE — ? PRESCRIPTION

## 2022-04-05 PROCEDURE — 17110 DESTRUCTION B9 LES UP TO 14: CPT

## 2022-04-05 PROCEDURE — 99213 OFFICE O/P EST LOW 20 MIN: CPT | Mod: 25

## 2022-04-05 PROCEDURE — ? LIQUID NITROGEN

## 2022-04-05 PROCEDURE — ? PRESCRIPTION MEDICATION MANAGEMENT

## 2022-04-05 RX ORDER — ECONAZOLE NITRATE 10 MG/G
1 CREAM TOPICAL QD
Qty: 30 | Refills: 2 | Status: ERX | COMMUNITY
Start: 2022-04-05

## 2022-04-05 RX ORDER — TERBINAFINE HYDROCHLORIDE 250 MG/1
1 TABLET ORAL
Qty: 50 | Refills: 0 | Status: ERX

## 2022-04-05 ASSESSMENT — LOCATION ZONE DERM
LOCATION ZONE: LEG
LOCATION ZONE: FACE
LOCATION ZONE: TRUNK
LOCATION ZONE: LIP

## 2022-04-05 ASSESSMENT — LOCATION SIMPLE DESCRIPTION DERM
LOCATION SIMPLE: RIGHT FOREHEAD
LOCATION SIMPLE: LEFT PRETIBIAL REGION
LOCATION SIMPLE: RIGHT PRETIBIAL REGION
LOCATION SIMPLE: ABDOMEN
LOCATION SIMPLE: RIGHT LIP

## 2022-04-05 ASSESSMENT — LOCATION DETAILED DESCRIPTION DERM
LOCATION DETAILED: RIGHT DISTAL PRETIBIAL REGION
LOCATION DETAILED: RIGHT INFERIOR VERMILION LIP
LOCATION DETAILED: RIGHT SUPERIOR FOREHEAD
LOCATION DETAILED: PERIUMBILICAL SKIN
LOCATION DETAILED: LEFT DISTAL PRETIBIAL REGION

## 2022-04-05 NOTE — PROCEDURE: LIQUID NITROGEN
Spray Paint Technique: No
Medical Necessity Clause: This procedure was medically necessary because the lesions that were treated were:there were actinic keratosis changes.
Consent: The patient's consent was obtained including but not limited to risks of crusting, scabbing, blistering, scarring, darker or lighter pigmentary change, recurrence, incomplete removal and infection.
Medical Necessity Information: It is in your best interest to select a reason for this procedure from the list below. All of these items fulfill various CMS LCD requirements except the new and changing color options.
Post-Care Instructions: I reviewed with the patient in detail post-care instructions. Patient is to wear sunprotection, and avoid picking at any of the treated lesions. Pt may apply Vaseline to crusted or scabbing areas.  PHOTO WAS TAKEN
Show Aperture Variable?: Yes
Number Of Freeze-Thaw Cycles: 2 freeze-thaw cycles
Detail Level: Simple
Spray Paint Text: The liquid nitrogen was applied to the skin utilizing a spray paint frosting technique.

## 2022-04-05 NOTE — PROCEDURE: KOH PREP
Calm/Appropriate
Body Location Override (Optional - Billing Will Still Be Based On Selected Body Map Location If Applicable): right foot
Detail Level: Detailed
Cpt Desired: 
Showing: fungal hyphal elements: positive
Koh Intro Text (From The.....): A KOH prep was ordered and evaluated from the
Koh Procedure Text (Tissue Harvesting Technique): A 15-blade scalpel was used to scrape the skin. The skin scrapings were placed on a glass slide, covered with a coverslip and a KOH solution was applied.

## 2022-04-05 NOTE — PROCEDURE: ORDER TESTS
Bill For Surgical Tray: no
Performing Laboratory: -[id number]
Billing Type: [employer]
Expected Date Of Service: 04/05/2022

## 2022-04-19 ENCOUNTER — APPOINTMENT (RX ONLY)
Dept: URBAN - METROPOLITAN AREA CLINIC 131 | Facility: CLINIC | Age: 77
Setting detail: DERMATOLOGY
End: 2022-04-19

## 2022-04-19 DIAGNOSIS — B35.4 TINEA CORPORIS: ICD-10-CM | Status: IMPROVED

## 2022-04-19 PROCEDURE — 99213 OFFICE O/P EST LOW 20 MIN: CPT

## 2022-04-19 PROCEDURE — ? ORDER TESTS

## 2022-04-19 PROCEDURE — ? COUNSELING

## 2022-04-19 PROCEDURE — ? PRESCRIPTION MEDICATION MANAGEMENT

## 2022-04-19 ASSESSMENT — LOCATION SIMPLE DESCRIPTION DERM
LOCATION SIMPLE: RIGHT ACHILLES SKIN
LOCATION SIMPLE: RIGHT FOOT
LOCATION SIMPLE: RIGHT THIGH
LOCATION SIMPLE: LEFT PLANTAR SURFACE
LOCATION SIMPLE: RIGHT PLANTAR SURFACE
LOCATION SIMPLE: LEFT FOOT
LOCATION SIMPLE: LEFT THIGH
LOCATION SIMPLE: LEFT ACHILLES SKIN

## 2022-04-19 ASSESSMENT — LOCATION DETAILED DESCRIPTION DERM
LOCATION DETAILED: LEFT ANTERIOR PROXIMAL THIGH
LOCATION DETAILED: LEFT MEDIAL PLANTAR MIDFOOT
LOCATION DETAILED: LEFT ACHILLES SKIN
LOCATION DETAILED: RIGHT ACHILLES SKIN
LOCATION DETAILED: RIGHT ANTERIOR PROXIMAL THIGH
LOCATION DETAILED: RIGHT MEDIAL PLANTAR MIDFOOT
LOCATION DETAILED: LEFT DORSAL FOOT
LOCATION DETAILED: RIGHT DORSAL FOOT

## 2022-04-19 ASSESSMENT — LOCATION ZONE DERM
LOCATION ZONE: FEET
LOCATION ZONE: LEG

## 2022-10-25 NOTE — PATIENT DISCUSSION
Discussed increased visual significance and patient is interested in a cataract eval.  She's undergoing chemotherapy right now and has an appt next week.  She wants to see how that comes out before scheduling cataract eval.  She's going to call us next week after her appt to schedule an eval as long as it comes out ok.  Discussed Dr. Gerald Campos due to previous connection with him.  Told her I will text him to let him know details if we schedule an eval.  She isn't interested in a premium IOL.

## 2022-10-25 NOTE — PATIENT DISCUSSION
No benefit to altering spec rx.  Hold spec rx pending cataract eval.  If her physical condition isn't favorable for a cataract surgery in the near term we discussed updating sunglasses rx.

## 2022-11-30 ENCOUNTER — APPOINTMENT (RX ONLY)
Dept: URBAN - METROPOLITAN AREA CLINIC 131 | Facility: CLINIC | Age: 77
Setting detail: DERMATOLOGY
End: 2022-11-30

## 2022-11-30 DIAGNOSIS — L50.3 DERMATOGRAPHIC URTICARIA: ICD-10-CM

## 2022-11-30 PROCEDURE — ? ADDITIONAL NOTES

## 2022-11-30 PROCEDURE — ? COUNSELING

## 2022-11-30 PROCEDURE — 99212 OFFICE O/P EST SF 10 MIN: CPT

## 2022-11-30 ASSESSMENT — LOCATION DETAILED DESCRIPTION DERM: LOCATION DETAILED: RIGHT MID-UPPER BACK

## 2022-11-30 ASSESSMENT — LOCATION ZONE DERM: LOCATION ZONE: TRUNK

## 2022-11-30 ASSESSMENT — LOCATION SIMPLE DESCRIPTION DERM: LOCATION SIMPLE: RIGHT UPPER BACK

## 2022-11-30 NOTE — HPI: RASH
What Type Of Note Output Would You Prefer (Optional)?: Bullet Format
How Severe Is Your Rash?: moderate
Is This A New Presentation, Or A Follow-Up?: Rash
Additional History: Patient has a history of tines corporis, states this rash is different than what he has felt in the past but his pcp states it is fungal and gave patient a pill for 6 weeks which cause patient to have edema, and kidney problems and urine issues, so he discontinued.

## 2022-11-30 NOTE — PROCEDURE: ADDITIONAL NOTES
Render Risk Assessment In Note?: no
Additional Notes: Recommended Allegra 24hrs BID  daily as discussed, for 2 weeks, than qd for 2 weeks.  discussed sensitive skin care protocol
Detail Level: Simple

## 2022-12-07 ENCOUNTER — COMPREHENSIVE EXAM (OUTPATIENT)
Dept: URBAN - METROPOLITAN AREA CLINIC 39 | Facility: CLINIC | Age: 77
End: 2022-12-07

## 2022-12-07 PROCEDURE — 92014 COMPRE OPH EXAM EST PT 1/>: CPT

## 2022-12-07 ASSESSMENT — VISUAL ACUITY
OS_CC: J1
OD_SC: J12
OD_CC: J1
OS_SC: 20/20
OD_SC: 20/20-2
OS_SC: J12

## 2022-12-07 ASSESSMENT — TONOMETRY
OS_IOP_MMHG: 14
OD_IOP_MMHG: 14

## 2023-03-09 ENCOUNTER — HOSPITAL ENCOUNTER (EMERGENCY)
Facility: HOSPITAL | Age: 78
Discharge: ANOTHER HOSPITAL | End: 2023-03-09
Attending: EMERGENCY MEDICINE
Payer: MEDICARE

## 2023-03-09 ENCOUNTER — HOSPITAL ENCOUNTER (INPATIENT)
Facility: HOSPITAL | Age: 78
LOS: 5 days | Discharge: HOME | DRG: 246 | End: 2023-03-14
Attending: INTERNAL MEDICINE | Admitting: INTERNAL MEDICINE
Payer: MEDICARE

## 2023-03-09 VITALS
SYSTOLIC BLOOD PRESSURE: 90 MMHG | RESPIRATION RATE: 20 BRPM | DIASTOLIC BLOOD PRESSURE: 48 MMHG | OXYGEN SATURATION: 94 % | HEART RATE: 76 BPM | BODY MASS INDEX: 26.41 KG/M2 | WEIGHT: 195 LBS | HEIGHT: 72 IN | TEMPERATURE: 98 F

## 2023-03-09 DIAGNOSIS — R10.13 EPIGASTRIC PAIN: ICD-10-CM

## 2023-03-09 DIAGNOSIS — R07.9 CHEST PAIN, UNSPECIFIED: ICD-10-CM

## 2023-03-09 DIAGNOSIS — I49.9 ABNORMAL HEART RHYTHM: ICD-10-CM

## 2023-03-09 DIAGNOSIS — I21.3 STEMI (ST ELEVATION MYOCARDIAL INFARCTION): Primary | ICD-10-CM

## 2023-03-09 DIAGNOSIS — I25.10 CAD (CORONARY ARTERY DISEASE): ICD-10-CM

## 2023-03-09 DIAGNOSIS — I21.19: Primary | ICD-10-CM

## 2023-03-09 DIAGNOSIS — I44.0 1ST DEGREE AV BLOCK: ICD-10-CM

## 2023-03-09 DIAGNOSIS — I45.9 HEART BLOCK: ICD-10-CM

## 2023-03-09 PROBLEM — K21.9 GASTROESOPHAGEAL REFLUX DISEASE: Status: ACTIVE | Noted: 2023-03-09

## 2023-03-09 PROBLEM — I10 PRIMARY HYPERTENSION: Status: ACTIVE | Noted: 2023-03-09

## 2023-03-09 PROBLEM — D12.6 TUBULAR ADENOMA OF COLON: Status: ACTIVE | Noted: 2018-11-15

## 2023-03-09 PROBLEM — I20.9 OTHER AND UNSPECIFIED ANGINA PECTORIS: Status: ACTIVE | Noted: 2023-03-09

## 2023-03-09 PROBLEM — R97.20 ELEVATED PSA: Status: ACTIVE | Noted: 2023-03-09

## 2023-03-09 PROBLEM — E78.5 OTHER AND UNSPECIFIED HYPERLIPIDEMIA: Status: ACTIVE | Noted: 2023-03-09

## 2023-03-09 PROBLEM — M19.90 OSTEOARTHRITIS: Status: ACTIVE | Noted: 2023-03-09

## 2023-03-09 PROBLEM — F39 AFFECTIVE PSYCHOSIS: Status: ACTIVE | Noted: 2023-03-09

## 2023-03-09 PROBLEM — M10.9 GOUT: Status: ACTIVE | Noted: 2023-03-09

## 2023-03-09 LAB
ALBUMIN SERPL-MCNC: 3.4 G/DL (ref 3.4–4.8)
ALBUMIN/GLOB SERPL: 0.8 RATIO (ref 1.1–2)
ALP SERPL-CCNC: 40 UNIT/L (ref 40–150)
ALT SERPL-CCNC: 32 UNIT/L (ref 0–55)
AST SERPL-CCNC: 57 UNIT/L (ref 5–34)
AV INDEX (PROSTH): 0.6
AV MEAN GRADIENT: 3 MMHG
AV PEAK GRADIENT: 7 MMHG
AV VALVE AREA: 1.89 CM2
AV VELOCITY RATIO: 0.69
BASOPHILS # BLD AUTO: 0.03 X10(3)/MCL (ref 0–0.2)
BASOPHILS NFR BLD AUTO: 0.2 %
BILIRUBIN DIRECT+TOT PNL SERPL-MCNC: 0.8 MG/DL
BSA FOR ECHO PROCEDURE: 2.12 M2
BUN SERPL-MCNC: 52.1 MG/DL (ref 8.4–25.7)
CALCIUM SERPL-MCNC: 10.3 MG/DL (ref 8.8–10)
CHLORIDE SERPL-SCNC: 90 MMOL/L (ref 98–107)
CO2 SERPL-SCNC: 25 MMOL/L (ref 23–31)
CREAT SERPL-MCNC: 2.51 MG/DL (ref 0.73–1.18)
CV ECHO LV RWT: 0.47 CM
DOP CALC AO PEAK VEL: 1.29 M/S
DOP CALC AO VTI: 21.1 CM
DOP CALC LVOT AREA: 3.1 CM2
DOP CALC LVOT DIAMETER: 2 CM
DOP CALC LVOT PEAK VEL: 0.89 M/S
DOP CALC LVOT STROKE VOLUME: 39.88 CM3
DOP CALC MV VTI: 19.1 CM
DOP CALCLVOT PEAK VEL VTI: 12.7 CM
E WAVE DECELERATION TIME: 193 MSEC
E/A RATIO: 0.96
E/E' RATIO: 11.29 M/S
ECHO LV POSTERIOR WALL: 1.04 CM (ref 0.6–1.1)
EJECTION FRACTION: 50 %
EOSINOPHIL # BLD AUTO: 0.01 X10(3)/MCL (ref 0–0.9)
EOSINOPHIL NFR BLD AUTO: 0.1 %
ERYTHROCYTE [DISTWIDTH] IN BLOOD BY AUTOMATED COUNT: 12.7 % (ref 11.5–17)
FRACTIONAL SHORTENING: 23 % (ref 28–44)
GFR SERPLBLD CREATININE-BSD FMLA CKD-EPI: 26 MLS/MIN/1.73/M2
GLOBULIN SER-MCNC: 4.2 GM/DL (ref 2.4–3.5)
GLUCOSE SERPL-MCNC: 110 MG/DL (ref 82–115)
GLUCOSE SERPL-MCNC: 88 MG/DL (ref 70–110)
HCT VFR BLD AUTO: 42.6 % (ref 42–52)
HGB BLD-MCNC: 14.3 G/DL (ref 14–18)
IMM GRANULOCYTES # BLD AUTO: 0.04 X10(3)/MCL (ref 0–0.04)
IMM GRANULOCYTES NFR BLD AUTO: 0.3 %
INTERVENTRICULAR SEPTUM: 1.3 CM (ref 0.6–1.1)
LEFT ATRIUM SIZE: 4 CM
LEFT ATRIUM VOLUME INDEX MOD: 20.4 ML/M2
LEFT ATRIUM VOLUME MOD: 43.1 CM3
LEFT INTERNAL DIMENSION IN SYSTOLE: 3.43 CM (ref 2.1–4)
LEFT VENTRICLE DIASTOLIC VOLUME INDEX: 42.23 ML/M2
LEFT VENTRICLE DIASTOLIC VOLUME: 89.1 ML
LEFT VENTRICLE MASS INDEX: 88 G/M2
LEFT VENTRICLE SYSTOLIC VOLUME INDEX: 23 ML/M2
LEFT VENTRICLE SYSTOLIC VOLUME: 48.5 ML
LEFT VENTRICULAR INTERNAL DIMENSION IN DIASTOLE: 4.43 CM (ref 3.5–6)
LEFT VENTRICULAR MASS: 186.43 G
LV LATERAL E/E' RATIO: 11.29 M/S
LV SEPTAL E/E' RATIO: 11.29 M/S
LVOT MG: 1 MMHG
LVOT MV: 0.54 CM/S
LYMPHOCYTES # BLD AUTO: 1.57 X10(3)/MCL (ref 0.6–4.6)
LYMPHOCYTES NFR BLD AUTO: 11.1 %
MCH RBC QN AUTO: 30.6 PG
MCHC RBC AUTO-ENTMCNC: 33.6 G/DL (ref 33–36)
MCV RBC AUTO: 91.2 FL (ref 80–94)
MONOCYTES # BLD AUTO: 1.69 X10(3)/MCL (ref 0.1–1.3)
MONOCYTES NFR BLD AUTO: 12 %
MV MEAN GRADIENT: 2 MMHG
MV PEAK A VEL: 0.82 M/S
MV PEAK E VEL: 0.79 M/S
MV PEAK GRADIENT: 3 MMHG
MV VALVE AREA BY CONTINUITY EQUATION: 2.09 CM2
NEUTROPHILS # BLD AUTO: 10.75 X10(3)/MCL (ref 2.1–9.2)
NEUTROPHILS NFR BLD AUTO: 76.3 %
PLATELET # BLD AUTO: 307 X10(3)/MCL (ref 130–400)
PMV BLD AUTO: 10.5 FL (ref 7.4–10.4)
POC CARDIAC TROPONIN I: 9.09 NG/ML (ref 0–0.08)
POTASSIUM SERPL-SCNC: 4.1 MMOL/L (ref 3.5–5.1)
PROT SERPL-MCNC: 7.6 GM/DL (ref 5.8–7.6)
RA MAJOR: 4.38 CM
RA PRESSURE: 3 MMHG
RA WIDTH: 3.07 CM
RBC # BLD AUTO: 4.67 X10(6)/MCL (ref 4.7–6.1)
RIGHT VENTRICULAR END-DIASTOLIC DIMENSION: 3.58 CM
SAMPLE: ABNORMAL
SODIUM SERPL-SCNC: 130 MMOL/L (ref 136–145)
TDI LATERAL: 0.07 M/S
TDI SEPTAL: 0.07 M/S
TDI: 0.07 M/S
TRICUSPID ANNULAR PLANE SYSTOLIC EXCURSION: 2.26 CM
WBC # SPEC AUTO: 14.1 X10(3)/MCL (ref 4.5–11.5)

## 2023-03-09 PROCEDURE — 93010 EKG 12-LEAD: ICD-10-PCS | Mod: ,,, | Performed by: INTERNAL MEDICINE

## 2023-03-09 PROCEDURE — 11000001 HC ACUTE MED/SURG PRIVATE ROOM

## 2023-03-09 PROCEDURE — 93458 L HRT ARTERY/VENTRICLE ANGIO: CPT | Mod: 59 | Performed by: INTERNAL MEDICINE

## 2023-03-09 PROCEDURE — C1725 CATH, TRANSLUMIN NON-LASER: HCPCS | Performed by: INTERNAL MEDICINE

## 2023-03-09 PROCEDURE — 25000003 PHARM REV CODE 250

## 2023-03-09 PROCEDURE — 85025 COMPLETE CBC W/AUTO DIFF WBC: CPT | Performed by: EMERGENCY MEDICINE

## 2023-03-09 PROCEDURE — C1769 GUIDE WIRE: HCPCS | Performed by: INTERNAL MEDICINE

## 2023-03-09 PROCEDURE — 99285 EMERGENCY DEPT VISIT HI MDM: CPT | Mod: 25

## 2023-03-09 PROCEDURE — 93010 EKG 12-LEAD: ICD-10-PCS | Mod: 77,,, | Performed by: INTERNAL MEDICINE

## 2023-03-09 PROCEDURE — 99285 EMERGENCY DEPT VISIT HI MDM: CPT | Mod: 25,27

## 2023-03-09 PROCEDURE — 93010 ELECTROCARDIOGRAM REPORT: CPT | Mod: ,,, | Performed by: INTERNAL MEDICINE

## 2023-03-09 PROCEDURE — 27000221 HC OXYGEN, UP TO 24 HOURS

## 2023-03-09 PROCEDURE — 80053 COMPREHEN METABOLIC PANEL: CPT | Performed by: EMERGENCY MEDICINE

## 2023-03-09 PROCEDURE — 21400001 HC TELEMETRY ROOM

## 2023-03-09 PROCEDURE — 27201423 OPTIME MED/SURG SUP & DEVICES STERILE SUPPLY: Performed by: INTERNAL MEDICINE

## 2023-03-09 PROCEDURE — 63600175 PHARM REV CODE 636 W HCPCS: Performed by: EMERGENCY MEDICINE

## 2023-03-09 PROCEDURE — 63600175 PHARM REV CODE 636 W HCPCS

## 2023-03-09 PROCEDURE — 25500020 PHARM REV CODE 255

## 2023-03-09 PROCEDURE — C1874 STENT, COATED/COV W/DEL SYS: HCPCS | Performed by: INTERNAL MEDICINE

## 2023-03-09 PROCEDURE — 25000003 PHARM REV CODE 250: Performed by: NURSE PRACTITIONER

## 2023-03-09 PROCEDURE — C1887 CATHETER, GUIDING: HCPCS | Performed by: INTERNAL MEDICINE

## 2023-03-09 PROCEDURE — 25000242 PHARM REV CODE 250 ALT 637 W/ HCPCS

## 2023-03-09 PROCEDURE — 93010 ELECTROCARDIOGRAM REPORT: CPT | Mod: 77,,, | Performed by: INTERNAL MEDICINE

## 2023-03-09 PROCEDURE — C1753 CATH, INTRAVAS ULTRASOUND: HCPCS | Performed by: INTERNAL MEDICINE

## 2023-03-09 PROCEDURE — 25000003 PHARM REV CODE 250: Performed by: EMERGENCY MEDICINE

## 2023-03-09 PROCEDURE — 92978 ENDOLUMINL IVUS OCT C 1ST: CPT | Mod: RC | Performed by: INTERNAL MEDICINE

## 2023-03-09 PROCEDURE — 93005 ELECTROCARDIOGRAM TRACING: CPT

## 2023-03-09 PROCEDURE — C9606 PERC D-E COR REVASC W AMI S: HCPCS | Mod: RC | Performed by: INTERNAL MEDICINE

## 2023-03-09 PROCEDURE — 96374 THER/PROPH/DIAG INJ IV PUSH: CPT

## 2023-03-09 DEVICE — EVEROLIMUS-ELUTING PLATINUM CHROMIUM CORONARY STENT SYSTEM
Type: IMPLANTABLE DEVICE | Site: CORONARY | Status: FUNCTIONAL
Brand: SYNERGY™ XD

## 2023-03-09 RX ORDER — ASPIRIN 81 MG/1
81 TABLET ORAL DAILY
Status: DISCONTINUED | OUTPATIENT
Start: 2023-03-10 | End: 2023-03-14 | Stop reason: HOSPADM

## 2023-03-09 RX ORDER — PHENYLEPHRINE HCL IN 0.9% NACL 1 MG/10 ML
SYRINGE (ML) INTRAVENOUS
Status: DISCONTINUED | OUTPATIENT
Start: 2023-03-09 | End: 2023-03-09 | Stop reason: HOSPADM

## 2023-03-09 RX ORDER — TIZANIDINE 4 MG/1
4 TABLET ORAL
COMMUNITY
Start: 2023-01-24

## 2023-03-09 RX ORDER — HYDRALAZINE HYDROCHLORIDE 20 MG/ML
10 INJECTION INTRAMUSCULAR; INTRAVENOUS EVERY 4 HOURS PRN
Status: DISCONTINUED | OUTPATIENT
Start: 2023-03-09 | End: 2023-03-14 | Stop reason: HOSPADM

## 2023-03-09 RX ORDER — VALACYCLOVIR HYDROCHLORIDE 1 G/1
1 TABLET, FILM COATED ORAL DAILY
COMMUNITY
Start: 2023-01-29

## 2023-03-09 RX ORDER — LANOLIN ALCOHOL/MO/W.PET/CERES
1000 CREAM (GRAM) TOPICAL DAILY
COMMUNITY
Start: 2022-10-17

## 2023-03-09 RX ORDER — PREDNISONE 10 MG/1
10 TABLET ORAL DAILY
COMMUNITY
Start: 2022-11-18

## 2023-03-09 RX ORDER — NITROGLYCERIN 0.4 MG/1
TABLET SUBLINGUAL
Status: COMPLETED
Start: 2023-03-09 | End: 2023-03-09

## 2023-03-09 RX ORDER — NAPROXEN SODIUM 220 MG/1
1 TABLET, FILM COATED ORAL DAILY
COMMUNITY

## 2023-03-09 RX ORDER — FENOFIBRATE 160 MG/1
1 TABLET ORAL DAILY
COMMUNITY

## 2023-03-09 RX ORDER — ADENOSINE 3 MG/ML
INJECTION, SOLUTION INTRAVENOUS
Status: DISCONTINUED | OUTPATIENT
Start: 2023-03-09 | End: 2023-03-09 | Stop reason: HOSPADM

## 2023-03-09 RX ORDER — MORPHINE SULFATE 4 MG/ML
2 INJECTION, SOLUTION INTRAMUSCULAR; INTRAVENOUS EVERY 4 HOURS PRN
Status: DISCONTINUED | OUTPATIENT
Start: 2023-03-09 | End: 2023-03-14 | Stop reason: HOSPADM

## 2023-03-09 RX ORDER — SODIUM CHLORIDE 9 MG/ML
100 INJECTION, SOLUTION INTRAVENOUS CONTINUOUS
Status: DISCONTINUED | OUTPATIENT
Start: 2023-03-09 | End: 2023-03-09

## 2023-03-09 RX ORDER — HEPARIN SODIUM 5000 [USP'U]/ML
5000 INJECTION, SOLUTION INTRAVENOUS; SUBCUTANEOUS
Status: COMPLETED | OUTPATIENT
Start: 2023-03-09 | End: 2023-03-09

## 2023-03-09 RX ORDER — MIDAZOLAM HYDROCHLORIDE 1 MG/ML
INJECTION INTRAMUSCULAR; INTRAVENOUS
Status: DISCONTINUED | OUTPATIENT
Start: 2023-03-09 | End: 2023-03-09 | Stop reason: HOSPADM

## 2023-03-09 RX ORDER — LISINOPRIL 20 MG/1
1 TABLET ORAL DAILY
COMMUNITY
Start: 2022-06-27 | End: 2023-06-28

## 2023-03-09 RX ORDER — NAPROXEN SODIUM 220 MG/1
324 TABLET, FILM COATED ORAL
Status: COMPLETED | OUTPATIENT
Start: 2023-03-09 | End: 2023-03-09

## 2023-03-09 RX ORDER — TIROFIBAN HYDROCHLORIDE 50 UG/ML
0.07 INJECTION INTRAVENOUS CONTINUOUS
Status: DISPENSED | OUTPATIENT
Start: 2023-03-09 | End: 2023-03-09

## 2023-03-09 RX ORDER — TIROFIBAN HYDROCHLORIDE 50 UG/ML
INJECTION INTRAVENOUS
Status: DISCONTINUED | OUTPATIENT
Start: 2023-03-09 | End: 2023-03-14 | Stop reason: HOSPADM

## 2023-03-09 RX ORDER — HEPARIN SODIUM 10000 [USP'U]/100ML
12 INJECTION, SOLUTION INTRAVENOUS
Status: DISCONTINUED | OUTPATIENT
Start: 2023-03-09 | End: 2023-03-09

## 2023-03-09 RX ORDER — FLUTICASONE PROPIONATE 50 MCG
2 SPRAY, SUSPENSION (ML) NASAL DAILY
COMMUNITY
Start: 2022-10-14 | End: 2023-06-08

## 2023-03-09 RX ORDER — VERAPAMIL HYDROCHLORIDE 2.5 MG/ML
INJECTION, SOLUTION INTRAVENOUS
Status: DISCONTINUED | OUTPATIENT
Start: 2023-03-09 | End: 2023-03-09 | Stop reason: HOSPADM

## 2023-03-09 RX ORDER — PANTOPRAZOLE SODIUM 40 MG/1
40 TABLET, DELAYED RELEASE ORAL DAILY
COMMUNITY
Start: 2023-01-05

## 2023-03-09 RX ORDER — ALBUTEROL SULFATE 90 UG/1
1 AEROSOL, METERED RESPIRATORY (INHALATION)
COMMUNITY

## 2023-03-09 RX ORDER — SODIUM CHLORIDE 9 MG/ML
100 INJECTION, SOLUTION INTRAVENOUS CONTINUOUS
Status: ACTIVE | OUTPATIENT
Start: 2023-03-09 | End: 2023-03-10

## 2023-03-09 RX ORDER — MAG HYDROX/ALUMINUM HYD/SIMETH 200-200-20
30 SUSPENSION, ORAL (FINAL DOSE FORM) ORAL ONCE
Status: COMPLETED | OUTPATIENT
Start: 2023-03-09 | End: 2023-03-09

## 2023-03-09 RX ORDER — HYDROCODONE BITARTRATE AND ACETAMINOPHEN 5; 325 MG/1; MG/1
1 TABLET ORAL EVERY 4 HOURS PRN
Status: DISCONTINUED | OUTPATIENT
Start: 2023-03-09 | End: 2023-03-14 | Stop reason: HOSPADM

## 2023-03-09 RX ORDER — ONDANSETRON 4 MG/1
8 TABLET, ORALLY DISINTEGRATING ORAL EVERY 8 HOURS PRN
Status: DISCONTINUED | OUTPATIENT
Start: 2023-03-09 | End: 2023-03-14 | Stop reason: HOSPADM

## 2023-03-09 RX ORDER — LIDOCAINE HYDROCHLORIDE 20 MG/ML
15 SOLUTION OROPHARYNGEAL ONCE
Status: COMPLETED | OUTPATIENT
Start: 2023-03-09 | End: 2023-03-09

## 2023-03-09 RX ORDER — NITROGLYCERIN 20 MG/100ML
INJECTION INTRAVENOUS
Status: DISCONTINUED | OUTPATIENT
Start: 2023-03-09 | End: 2023-03-09 | Stop reason: HOSPADM

## 2023-03-09 RX ORDER — ACETAMINOPHEN 325 MG/1
650 TABLET ORAL EVERY 4 HOURS PRN
Status: DISCONTINUED | OUTPATIENT
Start: 2023-03-09 | End: 2023-03-14 | Stop reason: HOSPADM

## 2023-03-09 RX ORDER — ATORVASTATIN CALCIUM 40 MG/1
40 TABLET, FILM COATED ORAL NIGHTLY
Status: DISCONTINUED | OUTPATIENT
Start: 2023-03-10 | End: 2023-03-14 | Stop reason: HOSPADM

## 2023-03-09 RX ORDER — FENTANYL CITRATE 50 UG/ML
INJECTION, SOLUTION INTRAMUSCULAR; INTRAVENOUS
Status: DISCONTINUED | OUTPATIENT
Start: 2023-03-09 | End: 2023-03-09 | Stop reason: HOSPADM

## 2023-03-09 RX ORDER — AMLODIPINE BESYLATE 10 MG/1
1 TABLET ORAL DAILY
Status: ON HOLD | COMMUNITY
End: 2023-03-14 | Stop reason: HOSPADM

## 2023-03-09 RX ORDER — MAG HYDROX/ALUMINUM HYD/SIMETH 200-200-20
SUSPENSION, ORAL (FINAL DOSE FORM) ORAL
Status: DISCONTINUED | OUTPATIENT
Start: 2023-03-09 | End: 2023-03-09 | Stop reason: HOSPADM

## 2023-03-09 RX ORDER — HEPARIN SODIUM 1000 [USP'U]/ML
INJECTION, SOLUTION INTRAVENOUS; SUBCUTANEOUS
Status: DISCONTINUED | OUTPATIENT
Start: 2023-03-09 | End: 2023-03-09 | Stop reason: HOSPADM

## 2023-03-09 RX ORDER — LIDOCAINE HYDROCHLORIDE 10 MG/ML
INJECTION INFILTRATION; PERINEURAL
Status: DISCONTINUED | OUTPATIENT
Start: 2023-03-09 | End: 2023-03-09 | Stop reason: HOSPADM

## 2023-03-09 RX ORDER — METOPROLOL SUCCINATE 25 MG/1
25 TABLET, EXTENDED RELEASE ORAL DAILY
Status: DISCONTINUED | OUTPATIENT
Start: 2023-03-10 | End: 2023-03-10

## 2023-03-09 RX ORDER — NITROGLYCERIN 0.4 MG/1
0.4 TABLET SUBLINGUAL
Status: COMPLETED | OUTPATIENT
Start: 2023-03-09 | End: 2023-03-09

## 2023-03-09 RX ADMIN — TICAGRELOR 90 MG: 90 TABLET ORAL at 08:03

## 2023-03-09 RX ADMIN — ALUMINUM HYDROXIDE, MAGNESIUM HYDROXIDE, AND SIMETHICONE 30 ML: 200; 200; 20 SUSPENSION ORAL at 09:03

## 2023-03-09 RX ADMIN — NITROGLYCERIN: 0.4 TABLET SUBLINGUAL at 10:03

## 2023-03-09 RX ADMIN — HEPARIN SODIUM 5000 UNITS: 5000 INJECTION, SOLUTION INTRAVENOUS; SUBCUTANEOUS at 10:03

## 2023-03-09 RX ADMIN — LIDOCAINE HYDROCHLORIDE 15 ML: 20 SOLUTION ORAL; TOPICAL at 09:03

## 2023-03-09 RX ADMIN — SODIUM CHLORIDE 1000 ML: 9 INJECTION, SOLUTION INTRAVENOUS at 10:03

## 2023-03-09 RX ADMIN — ASPIRIN 81 MG 324 MG: 81 TABLET ORAL at 10:03

## 2023-03-09 NOTE — Clinical Note
An angiography was performed of the left coronary arteries. The angiography was performed via power injection.

## 2023-03-09 NOTE — ED NOTES
Called [facility] er spoke to jose updated on new bp of 90/48/ 2nd line of 20 left wrist and pt leaving now

## 2023-03-09 NOTE — H&P
[facility] - Cath Lab Services  Cardiology  History and Physical Note    Patient Name: Reggie Cao  MRN: [id number]  Admission Date: 3/9/2023  Hospital Length of Stay: 0 days  Code Status: No Order   Attending Provider: Luís Marie MD   Consulting Provider: ERICK Becerra  Primary Care Physician: Tong Escobar MD  Principal Problem:<principal problem not specified>    Patient information was obtained from patient, past medical records, and ER records.     Subjective:     Chief Complaint:  chest pain    HPI:   Mr. Cao is a 78 y/o male,unknown to [facility], who presented to outlying facility due to c/o chest pain worsened with eating. Patient reports esophageal spasms x 1 week with difficulty getting liquids/solids down. EKG obtained revealed inferior STEMI; therefore he was transferred to [facility] for cath lab services. He was brought immediately to cath lab and underwent LHC revealing 100% RPL which was reduced to 0% post PTA/Stent with residual  Patient has been admitted to [facility] for STEMI    PMH:   PSH: none  Family History: unknown  Social History: nonsmoker    Previous Cardiac Diagnostics:   LHC 03.09.23:  Left main:  Large caliber vessel which bifurcates into the circumflex and LAD.  No significant disease noted.  Intravascular ultrasound showed 10% stenosis circumferentially.  Left anterior descending:  Moderate caliber vessel with luminal irregularities.  Small caliber diagonal branch D1 and D2 noted.  Circumflex:  Moderate caliber vessel.  True circumflex courses down the AV groove which is trivial size.  Om 2 proximally has a 70-80% long tubular stenosis.  RCA:  Large caliber dominant vessel with noted 100% occlusion of the proximal PLB.  The PDA also has an ostial 80-lesion.  Of note, intravascular ultrasound did show midvessel 50% stenosis, an ostial 40-50%   LVEDP:  13 mmHg  Aortic valve:  Stenosis     Intervention:  - RCA:  Proximal PLB-culprit vessel.  2.5 mm balloon angioplasty.  2.5  mm x 32 mm synergy stent.  Post dilated with a 3.5 mm x 8 mm, with IVUS guidance, 3.0 distally and 3.61 mm proximally with serial increasing diameters throughout.  Intracoronary, focal adenosine and nitroglycerin given within the PLB branch. Restoration of ANIYA status from 0 to 3     Impression:  Successful intervention of the 100% occluded proximal PLB   Residual 70-80% proximal OM2 (not intervened due to elevated renal function and contrast up to 150 cc)  Normal Left Ventricular end-diastolic pressure   No aortic stenosis       No past medical history on file.    No past surgical history on file.    Review of patient's allergies indicates:  No Known Allergies    Current Facility-Administered Medications on File Prior to Encounter   Medication    [COMPLETED] aluminum-magnesium hydroxide-simethicone 200-200-20 mg/5 mL suspension 30 mL    And    [COMPLETED] LIDOcaine HCl 2% oral solution 15 mL    [COMPLETED] aspirin chewable tablet 324 mg    [COMPLETED] heparin (porcine) injection 5,000 Units    [COMPLETED] nitroGLYCERIN SL tablet 0.4 mg    [DISCONTINUED] heparin 25,000 units in dextrose 5% 250 mL (100 units/mL) infusion (heparin infusion - NO NOMOGRAM)    [COMPLETED] sodium chloride 0.9% bolus 1,000 mL 1,000 mL     Current Outpatient Medications on File Prior to Encounter   Medication Sig    albuterol (PROVENTIL/VENTOLIN HFA) 90 mcg/actuation inhaler Inhale 1 puff into the lungs every 4 to 6 hours as needed.    amLODIPine (NORVASC) 10 MG tablet Take 1 tablet by mouth once daily.    aspirin 81 MG Chew Take 1 tablet by mouth once daily.    cyanocobalamin (VITAMIN B-12) 1000 MCG tablet Take 1,000 mcg by mouth once daily.    fenofibrate 160 MG Tab Take 1 tablet by mouth once daily.    fluticasone propionate (FLONASE) 50 mcg/actuation nasal spray 2 sprays by Each Nostril route once daily.    lisinopriL (PRINIVIL,ZESTRIL) 20 MG tablet Take 1 tablet by mouth once daily.    pantoprazole (PROTONIX) 40 MG tablet Take 40 mg by  mouth once daily.    predniSONE (DELTASONE) 10 MG tablet Take 10 mg by mouth once daily.    tiZANidine (ZANAFLEX) 4 MG tablet Take 4 mg by mouth after meals as needed.    valACYclovir (VALTREX) 1000 MG tablet Take 1 tablet by mouth once daily.     Family History    None       Tobacco Use    Smoking status: Not on file    Smokeless tobacco: Not on file   Substance and Sexual Activity    Alcohol use: Not on file    Drug use: Not on file    Sexual activity: Not on file       Review of Systems   Constitutional: Negative.    Respiratory: Negative.     Cardiovascular: Negative.    Genitourinary: Negative.    Musculoskeletal: Negative.    Skin: Negative.    Neurological: Negative.    Psychiatric/Behavioral: Negative.       Objective:     Vital Signs (Most Recent):    Vital Signs (24h Range):  Temp:  [97.8 °F (36.6 °C)] 97.8 °F (36.6 °C)  Pulse:  [76-84] 76  Resp:  [18-20] 20  SpO2:  [93 %-99 %] 94 %  BP: ()/(48-68) 90/48     Weight: 89 kg (196 lb 3.4 oz)  Body mass index is 26.61 kg/m².            No intake or output data in the 24 hours ending 03/09/23 1259    Lines/Drains/Airways       None                   Significant Labs:  Recent Results (from the past 72 hour(s))   Troponin ISTAT    Collection Time: 03/09/23  7:30 AM   Result Value Ref Range    POC Cardiac Troponin I 9.09 (H) 0.00 - 0.08 ng/mL    Sample unknown    Comprehensive metabolic panel    Collection Time: 03/09/23 10:15 AM   Result Value Ref Range    Sodium Level 130 (L) 136 - 145 mmol/L    Potassium Level 4.1 3.5 - 5.1 mmol/L    Chloride 90 (L) 98 - 107 mmol/L    Carbon Dioxide 25 23 - 31 mmol/L    Glucose Level 110 82 - 115 mg/dL    Blood Urea Nitrogen 52.1 (H) 8.4 - 25.7 mg/dL    Creatinine 2.51 (H) 0.73 - 1.18 mg/dL    Calcium Level Total 10.3 (H) 8.8 - 10.0 mg/dL    Protein Total 7.6 5.8 - 7.6 gm/dL    Albumin Level 3.4 3.4 - 4.8 g/dL    Globulin 4.2 (H) 2.4 - 3.5 gm/dL    Albumin/Globulin Ratio 0.8 (L) 1.1 - 2.0 ratio    Bilirubin Total 0.8 <=1.5  mg/dL    Alkaline Phosphatase 40 40 - 150 unit/L    Alanine Aminotransferase 32 0 - 55 unit/L    Aspartate Aminotransferase 57 (H) 5 - 34 unit/L    eGFR 26 mls/min/1.73/m2   CBC with Differential    Collection Time: 03/09/23 10:15 AM   Result Value Ref Range    WBC 14.1 (H) 4.5 - 11.5 x10(3)/mcL    RBC 4.67 (L) 4.70 - 6.10 x10(6)/mcL    Hgb 14.3 14.0 - 18.0 g/dL    Hct 42.6 42.0 - 52.0 %    MCV 91.2 80.0 - 94.0 fL    MCH 30.6 pg    MCHC 33.6 33.0 - 36.0 g/dL    RDW 12.7 11.5 - 17.0 %    Platelet 307 130 - 400 x10(3)/mcL    MPV 10.5 (H) 7.4 - 10.4 fL    Neut % 76.3 %    Lymph % 11.1 %    Mono % 12.0 %    Eos % 0.1 %    Basophil % 0.2 %    Lymph # 1.57 0.6 - 4.6 x10(3)/mcL    Neut # 10.75 (H) 2.1 - 9.2 x10(3)/mcL    Mono # 1.69 (H) 0.1 - 1.3 x10(3)/mcL    Eos # 0.01 0 - 0.9 x10(3)/mcL    Baso # 0.03 0 - 0.2 x10(3)/mcL    IG# 0.04 0 - 0.04 x10(3)/mcL    IG% 0.3 %       Significant Imaging:  Imaging Results    None         EKG:  No results found for this visit on 03/09/23.        Physical Exam  HENT:      Mouth/Throat:      Mouth: Mucous membranes are moist.   Cardiovascular:      Rate and Rhythm: Normal rate and regular rhythm.      Pulses: Normal pulses.      Heart sounds: Normal heart sounds.   Pulmonary:      Effort: Pulmonary effort is normal.      Breath sounds: Normal breath sounds.   Abdominal:      Palpations: Abdomen is soft.   Musculoskeletal:         General: Normal range of motion.   Skin:     General: Skin is warm.      Capillary Refill: Capillary refill takes less than 2 seconds.   Neurological:      General: No focal deficit present.      Mental Status: He is alert.   Psychiatric:         Mood and Affect: Mood normal.       Home Medications:   Current Facility-Administered Medications on File Prior to Encounter   Medication Dose Route Frequency Provider Last Rate Last Admin    [COMPLETED] aluminum-magnesium hydroxide-simethicone 200-200-20 mg/5 mL suspension 30 mL  30 mL Oral Once Brad Rodriguez MD    30 mL at 03/09/23 0950    And    [COMPLETED] LIDOcaine HCl 2% oral solution 15 mL  15 mL Oral Once Brad Rodriguez MD   15 mL at 03/09/23 0950    [COMPLETED] aspirin chewable tablet 324 mg  324 mg Oral ED 1 Time Brad Rodriguez MD   324 mg at 03/09/23 1015    [COMPLETED] heparin (porcine) injection 5,000 Units  5,000 Units Intravenous ED 1 Time Brad Rodriguez MD   5,000 Units at 03/09/23 1011    [COMPLETED] nitroGLYCERIN SL tablet 0.4 mg  0.4 mg Sublingual ED 1 Time Brad Rodriguez MD   Given at 03/09/23 1009    [DISCONTINUED] heparin 25,000 units in dextrose 5% 250 mL (100 units/mL) infusion (heparin infusion - NO NOMOGRAM)  12 Units/kg/hr Intravenous ED 1 Time Brad Rodriguez MD        [COMPLETED] sodium chloride 0.9% bolus 1,000 mL 1,000 mL  1,000 mL Intravenous ED 1 Time Brad Rodriguez  mL/hr at 03/09/23 1015 1,000 mL at 03/09/23 1015     Current Outpatient Medications on File Prior to Encounter   Medication Sig Dispense Refill    albuterol (PROVENTIL/VENTOLIN HFA) 90 mcg/actuation inhaler Inhale 1 puff into the lungs every 4 to 6 hours as needed.      amLODIPine (NORVASC) 10 MG tablet Take 1 tablet by mouth once daily.      aspirin 81 MG Chew Take 1 tablet by mouth once daily.      cyanocobalamin (VITAMIN B-12) 1000 MCG tablet Take 1,000 mcg by mouth once daily.      fenofibrate 160 MG Tab Take 1 tablet by mouth once daily.      fluticasone propionate (FLONASE) 50 mcg/actuation nasal spray 2 sprays by Each Nostril route once daily.      lisinopriL (PRINIVIL,ZESTRIL) 20 MG tablet Take 1 tablet by mouth once daily.      pantoprazole (PROTONIX) 40 MG tablet Take 40 mg by mouth once daily.      predniSONE (DELTASONE) 10 MG tablet Take 10 mg by mouth once daily.      tiZANidine (ZANAFLEX) 4 MG tablet Take 4 mg by mouth after meals as needed.      valACYclovir (VALTREX) 1000 MG tablet Take 1 tablet by mouth once daily.         Current Inpatient Medications:    Current Facility-Administered  Medications:     0.9%  NaCl infusion, 100 mL, Intravenous, Continuous, Luís Marie MD    acetaminophen tablet 650 mg, 650 mg, Oral, Q4H PRN, Luís Marie MD    adenosine injection, , , PRN, Physician Kenneth Christianson MD, 75 mcg at 03/09/23 1158    aluminum-magnesium hydroxide-simethicone 200-200-20 mg/5 mL suspension, , , PRN, Physician Kenneth Christianson MD, 30 mL at 03/09/23 1212    [START ON 3/10/2023] aspirin EC tablet 81 mg, 81 mg, Oral, Daily, Luís Marie MD    [START ON 3/10/2023] atorvastatin tablet 40 mg, 40 mg, Oral, QHS, Luís Marie MD    fentaNYL injection, , , PRN, Physician Kenneth Christianson MD, 50 mcg at 03/09/23 1159    heparin (porcine) injection, , , PRN, Physician Kenneth Christianson MD, 2,000 Units at 03/09/23 1158    hydrALAZINE injection 10 mg, 10 mg, Intravenous, Q4H PRN, Luís Marie MD    HYDROcodone-acetaminophen 5-325 mg per tablet 1 tablet, 1 tablet, Oral, Q4H PRN, Luís Marie MD    iopamidoL (ISOVUE-370) injection, , , PRN, Physician Kenneth Christianson MD, 150 mL at 03/09/23 1211    LIDOcaine HCL 10 mg/ml (1%) injection, , , PRN, Physician Kenneth Christianson MD, 10 mL at 03/09/23 1121    [START ON 3/10/2023] metoprolol succinate (TOPROL-XL) 24 hr tablet 25 mg, 25 mg, Oral, Daily, Luís Marie MD    midazolam (VERSED) 1 mg/mL injection, , , PRN, Physician Kenneth Christianson MD, 1 mg at 03/09/23 1139    morphine injection 2 mg, 2 mg, Intravenous, Q4H PRN, Luís Marie MD    nitroGLYCERIN in dextrose 5% 200 mcg/mL IVP, , , PRN, Physician Kenneth Christianson MD, 100 mcg at 03/09/23 1151    ondansetron disintegrating tablet 8 mg, 8 mg, Oral, Q8H PRN, Luís Marie MD    phenylephrine HCl in 0.9% NaCl 1 mg/10 mL (100 mcg/mL) syringe, , , PRN, [facility], MD, 300 mcg at 03/09/23 1129    [START ON 3/10/2023] ticagrelor tablet 90 mg, 90 mg, Oral, BID, Luís Marie MD    ticagrelor tablet, , , PRN, [facility], MD, 180 mg at 03/09/23 1212    tirofiban 12.5 mg in sodium chloride 0.9% 250 mL  infusion, , , Continuous PRN, Physician One MD Meghan, Last Rate: 8 mL/hr at 03/09/23 1203, 0.075 mcg/kg/min at 03/09/23 1203    tirofiban 12.5 mg in sodium chloride 0.9% 250 mL infusion, 0.075 mcg/kg/min (Order-Specific), Intravenous, Continuous, Luís Marie MD    verapamiL injection, , , PRN, Physician One MD Meghan, 2.5 mg at 03/09/23 1124         VTE Risk Mitigation (From admission, onward)           Ordered     heparin (porcine) injection  As needed (PRN)         03/09/23 1125                    Assessment:   Inferior STEMI  Native CAD  --[facility] 3.9.23: OM 2 70-80% long tubular stenosis. % occlusion of proximal PLB. Ostial PDA 80% stenosis. midPDA 50% stenosis and ostial 40-50% stenosis.  Leukocytosis  Hyponatremia  Hyperkalemia  ERNESTO    Plan:   Continue Aggrastat, Brilinta 90 mg bid, aspirin 81 mg daily, and atorvastatin 80 mg daily  Start Metoprolol succinate 25 mg daily once BP allows  Obtain echo.   Start NS at 50 ml/hr due to ERNESTO  Monitor renal function  EKG in am          ERICK Becerra  Cardiology  [facility] - Cath Lab Services  03/09/2023 12:59 PM

## 2023-03-09 NOTE — Clinical Note
The catheter was inserted into the ostium   left main. An angiography was performed of the left coronary arteries. The angiography was performed via power injection.

## 2023-03-09 NOTE — Clinical Note
The catheter was removed from the and was repositioned into the ostium   right coronary artery. An angiography was performed of the right coronary arteries. Multiple views were taken. The angiography was performed via power injection.

## 2023-03-09 NOTE — Clinical Note
The catheter was inserted into the, was removed from the and was inserted over the wire into the RPLB artery.

## 2023-03-09 NOTE — Clinical Note
The catheter was inserted into the, was removed from the and was inserted over the wire into the RPLB artery. IVUS performed.

## 2023-03-09 NOTE — Clinical Note
The catheter was repositioned into the and was inserted over the wire into the ostium   left main. An angiography was performed of the left coronary arteries. Multiple views were taken. The angiography was performed via power injection.

## 2023-03-09 NOTE — ED PROVIDER NOTES
Encounter Date: 3/9/2023       History     Chief Complaint   Patient presents with    esophageal spasm     Esophageal spasm times one week states able to get liquids and solids down just has a decrease appetite     This 77-year-old man presents to the esophageal spasm that began on Sunday.  He states pain in his chest but gets worse when he tries to eat.  States that he is had similar symptoms in the past and has been put on pantoprazole.  He also states during hospitalization they told him it was not his heart.  He also had a Full cardiac workup 5 years ago when he had shoulder surgery and states he was told everything was fine.  He denies radiation of the pain denies nausea or vomiting eyes sweats or shortness of breath.  He is not a smoker.  He is from [facility].     Review of patient's allergies indicates:  No Known Allergies  History reviewed. No pertinent past medical history.  Past Surgical History:   Procedure Laterality Date    CORONARY ANGIOGRAPHY N/A 3/9/2023    Procedure: ANGIOGRAM, CORONARY ARTERY;  Surgeon: Luís Marie MD;  Location: [facility] CATH LAB;  Service: Cardiology;  Laterality: N/A;    CORONARY ANGIOGRAPHY N/A 3/12/2023    Procedure: ANGIOGRAM, CORONARY ARTERY;  Surgeon: Jess Whitley MD;  Location: [facility] CATH LAB;  Service: Cardiology;  Laterality: N/A;    CORONARY ANGIOPLASTY WITH STENT PLACEMENT N/A 3/12/2023    Procedure: ANGIOPLASTY, CORONARY ARTERY, WITH STENT INSERTION;  Surgeon: Jess Whitley MD;  Location: [facility] CATH LAB;  Service: Cardiology;  Laterality: N/A;    STENT, DRUG ELUTING, SINGLE VESSEL, CORONARY  3/9/2023    Procedure: Stent, Drug Eluting, Single Vessel, Coronary;  Surgeon: Luís Marie MD;  Location: [facility] CATH LAB;  Service: Cardiology;;     History reviewed. No pertinent family history.     Review of Systems   Constitutional:  Negative for fever.   HENT:  Negative for sore throat.    Respiratory:  Negative for shortness of breath.    Cardiovascular:  Negative for  chest pain.   Gastrointestinal:  Positive for abdominal pain. Negative for nausea.   Genitourinary:  Negative for dysuria.   Musculoskeletal:  Negative for back pain.   Skin:  Negative for rash.   Neurological:  Negative for weakness.   Hematological:  Does not bruise/bleed easily.     Physical Exam     Initial Vitals [03/09/23 0906]   BP Pulse Resp Temp SpO2   103/68 84 20 97.8 °F (36.6 °C) 99 %      MAP       --         Physical Exam    Constitutional: He appears well-developed and well-nourished.   HENT:   Head: Normocephalic and atraumatic.   Mouth/Throat: Mucous membranes are normal.   Eyes: EOM are normal. Pupils are equal, round, and reactive to light.   Neck: Neck supple.   Normal range of motion.  Cardiovascular:  Normal rate, regular rhythm, normal heart sounds and intact distal pulses.           Pulmonary/Chest: Breath sounds normal.   Abdominal: Abdomen is soft. Bowel sounds are normal.   Musculoskeletal:         General: Normal range of motion.      Cervical back: Normal range of motion and neck supple.     Neurological: He is alert and oriented to person, place, and time. He has normal strength.   Skin: Skin is warm and dry. Capillary refill takes less than 2 seconds.   Psychiatric: He has a normal mood and affect. His behavior is normal. Judgment and thought content normal.       ED Course   Procedures  Labs Reviewed   COMPREHENSIVE METABOLIC PANEL - Abnormal; Notable for the following components:       Result Value    Sodium Level 130 (*)     Chloride 90 (*)     Blood Urea Nitrogen 52.1 (*)     Creatinine 2.51 (*)     Calcium Level Total 10.3 (*)     Globulin 4.2 (*)     Albumin/Globulin Ratio 0.8 (*)     Aspartate Aminotransferase 57 (*)     All other components within normal limits   CBC WITH DIFFERENTIAL - Abnormal; Notable for the following components:    WBC 14.1 (*)     RBC 4.67 (*)     MPV 10.5 (*)     Neut # 10.75 (*)     Mono # 1.69 (*)     All other components within normal limits    TROPONIN ISTAT - Abnormal; Notable for the following components:    POC Cardiac Troponin I 9.09 (*)     All other components within normal limits   CBC W/ AUTO DIFFERENTIAL    Narrative:     The following orders were created for panel order CBC auto differential.  Procedure                               Abnormality         Status                     ---------                               -----------         ------                     CBC with Differential[[id number]]        Abnormal            Final result                 Please view results for these tests on the individual orders.     EKG Readings: (Independently Interpreted)   Rhythm: Accelerated Junctional Rhythm. Heart Rate: 81. ST Segment Elevation: II, III and AVF. ST Segment Depression: AVL and AVR. Clinical Impression: Accelerated Junctional Rhythm and Myocardial Infarction   3/9/23 0955   ECG Results              EKG 12-lead (Final result)  Result time 03/10/23 21:47:22      Final result by Interface, Lab In [facility] (03/10/23 21:47:22)                   Narrative:    Test Reason : R10.13,    Vent. Rate : 081 BPM     Atrial Rate : 085 BPM     P-R Int : 000 ms          QRS Dur : 092 ms      QT Int : 378 ms       P-R-T Axes : 000 005 088 degrees     QTc Int : 439 ms    Accelerated Junctional rhythm with retrograde conduction  Inferior infarct , possibly acute    ACUTE MI / STEMI      Abnormal ECG  No previous ECGs available  Confirmed by Shahzad Perera MD ([id number]) on 3/10/2023 9:47:10 PM    Referred By: [email or web address]   SELF           Confirmed By:Shahzad Perera MD                                  Imaging Results    None          Medications   aluminum-magnesium hydroxide-simethicone 200-200-20 mg/5 mL suspension 30 mL (30 mLs Oral Given 3/9/23 0950)     And   LIDOcaine HCl 2% oral solution 15 mL (15 mLs Oral Given 3/9/23 0950)   heparin (porcine) injection 5,000 Units (5,000 Units Intravenous Given 3/9/23 1011)   aspirin chewable tablet 324 mg (324 mg Oral Given  3/9/23 1015)   nitroGLYCERIN SL tablet 0.4 mg ( Sublingual Given 3/9/23 1009)     Medical Decision Making:   Initial Assessment:   78 y/o man presents with esophageal spasm. EKG shows acute MI.  Differential Diagnosis:   MI  Independently Interpreted Test(s):   I have ordered and independently interpreted EKG Reading(s) - see prior notes  Clinical Tests:   Lab Tests: Ordered and Reviewed       <> Summary of Lab: Trop 9.09 WBC 14.1,  BUN/Creatinine 52/2.5  Medical Tests: Ordered and Reviewed  ED Management:  Patient administered GI cocktail then aspirin, NTG, Heparin and transfer to [place] to cath lab.  Other:   I have discussed this case with another health care provider.       <> Summary of the Discussion: Spoke to Dr. Garcia at [facility] and she accepted for cath lab.  Critical care 30 minutes.           ED Course as of 04/07/23 1226   Thu Mar 09, 2023   1010 Spoke to DR Garcia at [facility] who accepted. [GA]      ED Course User Index  [GA] Brad Rodriguez MD                 Clinical Impression:   Final diagnoses:  [R10.13] Epigastric pain  [I21.19] Acute myocardial infarction involving other coronary artery of inferior wall, unspecified MI type (Primary)        ED Disposition Condition    Transfer to Another Facility Stable                Brad Rodriguez MD  03/09/23 1013       Brad Rodriguez MD  04/07/23 1227

## 2023-03-10 LAB
ANION GAP SERPL CALC-SCNC: 10 MEQ/L
BASOPHILS # BLD AUTO: 0.02 X10(3)/MCL (ref 0–0.2)
BASOPHILS NFR BLD AUTO: 0.2 %
BUN SERPL-MCNC: 45.8 MG/DL (ref 8.4–25.7)
CALCIUM SERPL-MCNC: 8.7 MG/DL (ref 8.8–10)
CHLORIDE SERPL-SCNC: 104 MMOL/L (ref 98–107)
CHOLEST SERPL-MCNC: 167 MG/DL
CHOLEST/HDLC SERPL: 3 {RATIO} (ref 0–5)
CO2 SERPL-SCNC: 21 MMOL/L (ref 23–31)
CREAT SERPL-MCNC: 1.41 MG/DL (ref 0.73–1.18)
CREAT/UREA NIT SERPL: 32
EOSINOPHIL # BLD AUTO: 0.14 X10(3)/MCL (ref 0–0.9)
EOSINOPHIL NFR BLD AUTO: 1.3 %
ERYTHROCYTE [DISTWIDTH] IN BLOOD BY AUTOMATED COUNT: 13.2 % (ref 11.5–17)
EST. AVERAGE GLUCOSE BLD GHB EST-MCNC: 102.5 MG/DL
GFR SERPLBLD CREATININE-BSD FMLA CKD-EPI: 52 MLS/MIN/1.73/M2
GLUCOSE SERPL-MCNC: 107 MG/DL (ref 70–110)
GLUCOSE SERPL-MCNC: 95 MG/DL (ref 82–115)
HBA1C MFR BLD: 5.2 %
HCT VFR BLD AUTO: 35.5 % (ref 42–52)
HDLC SERPL-MCNC: 50 MG/DL (ref 35–60)
HGB BLD-MCNC: 12.2 G/DL (ref 14–18)
IMM GRANULOCYTES # BLD AUTO: 0.07 X10(3)/MCL (ref 0–0.04)
IMM GRANULOCYTES NFR BLD AUTO: 0.6 %
LDLC SERPL CALC-MCNC: 92 MG/DL (ref 50–140)
LYMPHOCYTES # BLD AUTO: 1.45 X10(3)/MCL (ref 0.6–4.6)
LYMPHOCYTES NFR BLD AUTO: 13 %
MCH RBC QN AUTO: 31.2 PG
MCHC RBC AUTO-ENTMCNC: 34.4 G/DL (ref 33–36)
MCV RBC AUTO: 90.8 FL (ref 80–94)
MONOCYTES # BLD AUTO: 1.26 X10(3)/MCL (ref 0.1–1.3)
MONOCYTES NFR BLD AUTO: 11.3 %
NEUTROPHILS # BLD AUTO: 8.21 X10(3)/MCL (ref 2.1–9.2)
NEUTROPHILS NFR BLD AUTO: 73.6 %
NRBC BLD AUTO-RTO: 0 %
PLATELET # BLD AUTO: 253 X10(3)/MCL (ref 130–400)
PMV BLD AUTO: 10.8 FL (ref 7.4–10.4)
POCT GLUCOSE: 107 MG/DL (ref 70–110)
POCT GLUCOSE: 88 MG/DL (ref 70–110)
POTASSIUM SERPL-SCNC: 3.7 MMOL/L (ref 3.5–5.1)
RBC # BLD AUTO: 3.91 X10(6)/MCL (ref 4.7–6.1)
SODIUM SERPL-SCNC: 135 MMOL/L (ref 136–145)
TRIGL SERPL-MCNC: 125 MG/DL (ref 34–140)
VLDLC SERPL CALC-MCNC: 25 MG/DL
WBC # SPEC AUTO: 11.2 X10(3)/MCL (ref 4.5–11.5)

## 2023-03-10 PROCEDURE — 25000003 PHARM REV CODE 250: Performed by: NURSE PRACTITIONER

## 2023-03-10 PROCEDURE — 93010 EKG 12-LEAD: ICD-10-PCS | Mod: 76,,, | Performed by: INTERNAL MEDICINE

## 2023-03-10 PROCEDURE — 80061 LIPID PANEL: CPT | Performed by: NURSE PRACTITIONER

## 2023-03-10 PROCEDURE — 27000221 HC OXYGEN, UP TO 24 HOURS

## 2023-03-10 PROCEDURE — 25000003 PHARM REV CODE 250: Performed by: PHYSICIAN ASSISTANT

## 2023-03-10 PROCEDURE — 93010 ELECTROCARDIOGRAM REPORT: CPT | Mod: 76,,, | Performed by: INTERNAL MEDICINE

## 2023-03-10 PROCEDURE — 85025 COMPLETE CBC W/AUTO DIFF WBC: CPT | Performed by: INTERNAL MEDICINE

## 2023-03-10 PROCEDURE — 93005 ELECTROCARDIOGRAM TRACING: CPT

## 2023-03-10 PROCEDURE — 83036 HEMOGLOBIN GLYCOSYLATED A1C: CPT | Performed by: NURSE PRACTITIONER

## 2023-03-10 PROCEDURE — 25000003 PHARM REV CODE 250: Performed by: INTERNAL MEDICINE

## 2023-03-10 PROCEDURE — 21400001 HC TELEMETRY ROOM

## 2023-03-10 PROCEDURE — 80048 BASIC METABOLIC PNL TOTAL CA: CPT | Performed by: INTERNAL MEDICINE

## 2023-03-10 RX ORDER — MAG HYDROX/ALUMINUM HYD/SIMETH 200-200-20
30 SUSPENSION, ORAL (FINAL DOSE FORM) ORAL
Status: DISCONTINUED | OUTPATIENT
Start: 2023-03-10 | End: 2023-03-11

## 2023-03-10 RX ORDER — SIMETHICONE 80 MG
1 TABLET,CHEWABLE ORAL 3 TIMES DAILY PRN
Status: DISCONTINUED | OUTPATIENT
Start: 2023-03-10 | End: 2023-03-11

## 2023-03-10 RX ORDER — PANTOPRAZOLE SODIUM 40 MG/1
40 TABLET, DELAYED RELEASE ORAL DAILY
Status: DISCONTINUED | OUTPATIENT
Start: 2023-03-10 | End: 2023-03-14 | Stop reason: HOSPADM

## 2023-03-10 RX ADMIN — ATORVASTATIN CALCIUM 40 MG: 40 TABLET, FILM COATED ORAL at 08:03

## 2023-03-10 RX ADMIN — PANTOPRAZOLE SODIUM 40 MG: 40 TABLET, DELAYED RELEASE ORAL at 05:03

## 2023-03-10 RX ADMIN — TICAGRELOR 90 MG: 90 TABLET ORAL at 08:03

## 2023-03-10 RX ADMIN — ASPIRIN 81 MG: 81 TABLET, COATED ORAL at 08:03

## 2023-03-10 RX ADMIN — ALUMINUM HYDROXIDE, MAGNESIUM HYDROXIDE, AND SIMETHICONE 30 ML: 200; 200; 20 SUSPENSION ORAL at 08:03

## 2023-03-10 RX ADMIN — METOPROLOL SUCCINATE 25 MG: 25 TABLET, EXTENDED RELEASE ORAL at 08:03

## 2023-03-10 NOTE — PROGRESS NOTES
[facility] - Cath Lab Services  Cardiology  Progress Note    Patient Name: Reggie Cao  MRN: [id number]  Admission Date: 3/9/2023  Hospital Length of Stay: 1 days  Code Status: No Order   Attending Provider: Luís Marie MD   Consulting Provider: ERICK Becerra  Primary Care Physician: Tong Escobar MD  Principal Problem:<principal problem not specified>    Patient information was obtained from patient, past medical records, and ER records.     Subjective:     Chief Complaint:  chest pain    HPI:   Mr. Cao is a 76 y/o male,unknown to [facility], who presented to outlying facility due to c/o chest pain worsened with eating. Patient reports esophageal spasms x 1 week with difficulty getting liquids/solids down. EKG obtained revealed inferior STEMI; therefore he was transferred to [facility] for cath lab services. He was brought immediately to cath lab and underwent LHC revealing 100% RPL which was reduced to 0% post PTA/Stent with residual  Patient has been admitted to [facility] for STEMI    Hospital Course:   03.10.23: Patient awake in bed. NAD. VSS. Denies Cp/SOB. Renal indices improving.     PMH: HTN  PSH: none  Family History: unknown  Social History: nonsmoker    Previous Cardiac Diagnostics:   LHC 03.09.23:  Left main:  Large caliber vessel which bifurcates into the circumflex and LAD.  No significant disease noted.  Intravascular ultrasound showed 10% stenosis circumferentially.  Left anterior descending:  Moderate caliber vessel with luminal irregularities.  Small caliber diagonal branch D1 and D2 noted.  Circumflex:  Moderate caliber vessel.  True circumflex courses down the AV groove which is trivial size.  Om 2 proximally has a 70-80% long tubular stenosis.  RCA:  Large caliber dominant vessel with noted 100% occlusion of the proximal PLB.  The PDA also has an ostial 80-lesion.  Of note, intravascular ultrasound did show midvessel 50% stenosis, an ostial 40-50%   LVEDP:  13 mmHg  Aortic valve:   Stenosis     Intervention:  - RCA:  Proximal PLB-culprit vessel.  2.5 mm balloon angioplasty.  2.5 mm x 32 mm synergy stent.  Post dilated with a 3.5 mm x 8 mm, with IVUS guidance, 3.0 distally and 3.61 mm proximally with serial increasing diameters throughout.  Intracoronary, focal adenosine and nitroglycerin given within the PLB branch. Restoration of ANIYA status from 0 to 3     Impression:  Successful intervention of the 100% occluded proximal PLB   Residual 70-80% proximal OM2 (not intervened due to elevated renal function and contrast up to 150 cc)  Normal Left Ventricular end-diastolic pressure   No aortic stenosis    TTE 03.09.23:  The left ventricle is normal in size with concentric remodeling and low normal systolic function. LVEF 50-55% with inferior wall hypokinesis. Normal left ventricular diastolic function. Normal right ventricular size with normal right ventricular systolic function. Mild mitral regurgitation.       Review of Systems   Constitutional: Negative.    Respiratory: Negative.     Cardiovascular: Negative.    Genitourinary: Negative.    Musculoskeletal: Negative.    Skin: Negative.    Neurological: Negative.    Psychiatric/Behavioral: Negative.       Objective:     Vital Signs (Most Recent):  Temp: 98.4 °F (36.9 °C) (03/10/23 0300)  Pulse: 70 (03/10/23 1200)  Resp: 20 (03/10/23 0300)  BP: 123/69 (03/10/23 0810)  SpO2: 95 % (03/10/23 1200) Vital Signs (24h Range):  Temp:  [98.4 °F (36.9 °C)-100.1 °F (37.8 °C)] 98.4 °F (36.9 °C)  Pulse:  [70-85] 70  Resp:  [12-30] 20  SpO2:  [93 %-97 %] 95 %  BP: ()/(56-98) 123/69     Weight: 89 kg (196 lb 3.4 oz)  Body mass index is 26.87 kg/m².    SpO2: 95 %       No intake or output data in the 24 hours ending 03/10/23 1308    Lines/Drains/Airways       Peripheral Intravenous Line  Duration                  Peripheral IV - Single Lumen 03/09/23 1900 Anterior;Distal;Left Upper Arm <1 day         Peripheral IV - Single Lumen 03/09/23 1900 Left;Posterior  Hand <1 day                    Significant Labs:  Recent Results (from the past 72 hour(s))   Troponin ISTAT    Collection Time: 03/09/23  7:30 AM   Result Value Ref Range    POC Cardiac Troponin I 9.09 (H) 0.00 - 0.08 ng/mL    Sample unknown    Comprehensive metabolic panel    Collection Time: 03/09/23 10:15 AM   Result Value Ref Range    Sodium Level 130 (L) 136 - 145 mmol/L    Potassium Level 4.1 3.5 - 5.1 mmol/L    Chloride 90 (L) 98 - 107 mmol/L    Carbon Dioxide 25 23 - 31 mmol/L    Glucose Level 110 82 - 115 mg/dL    Blood Urea Nitrogen 52.1 (H) 8.4 - 25.7 mg/dL    Creatinine 2.51 (H) 0.73 - 1.18 mg/dL    Calcium Level Total 10.3 (H) 8.8 - 10.0 mg/dL    Protein Total 7.6 5.8 - 7.6 gm/dL    Albumin Level 3.4 3.4 - 4.8 g/dL    Globulin 4.2 (H) 2.4 - 3.5 gm/dL    Albumin/Globulin Ratio 0.8 (L) 1.1 - 2.0 ratio    Bilirubin Total 0.8 <=1.5 mg/dL    Alkaline Phosphatase 40 40 - 150 unit/L    Alanine Aminotransferase 32 0 - 55 unit/L    Aspartate Aminotransferase 57 (H) 5 - 34 unit/L    eGFR 26 mls/min/1.73/m2   CBC with Differential    Collection Time: 03/09/23 10:15 AM   Result Value Ref Range    WBC 14.1 (H) 4.5 - 11.5 x10(3)/mcL    RBC 4.67 (L) 4.70 - 6.10 x10(6)/mcL    Hgb 14.3 14.0 - 18.0 g/dL    Hct 42.6 42.0 - 52.0 %    MCV 91.2 80.0 - 94.0 fL    MCH 30.6 pg    MCHC 33.6 33.0 - 36.0 g/dL    RDW 12.7 11.5 - 17.0 %    Platelet 307 130 - 400 x10(3)/mcL    MPV 10.5 (H) 7.4 - 10.4 fL    Neut % 76.3 %    Lymph % 11.1 %    Mono % 12.0 %    Eos % 0.1 %    Basophil % 0.2 %    Lymph # 1.57 0.6 - 4.6 x10(3)/mcL    Neut # 10.75 (H) 2.1 - 9.2 x10(3)/mcL    Mono # 1.69 (H) 0.1 - 1.3 x10(3)/mcL    Eos # 0.01 0 - 0.9 x10(3)/mcL    Baso # 0.03 0 - 0.2 x10(3)/mcL    IG# 0.04 0 - 0.04 x10(3)/mcL    IG% 0.3 %   Echo    Collection Time: 03/09/23  2:06 PM   Result Value Ref Range    BSA 2.12 m2    TDI SEPTAL 0.07 m/s    LV LATERAL E/E' RATIO 11.29 m/s    LV SEPTAL E/E' RATIO 11.29 m/s    EF 50 %    Left Ventricular Outflow Tract  Mean Velocity 0.54 cm/s    Left Ventricular Outflow Tract Mean Gradient 1.00 mmHg    TDI LATERAL 0.07 m/s    LVIDd 4.43 3.5 - 6.0 cm    IVS 1.30 (A) 0.6 - 1.1 cm    Posterior Wall 1.04 0.6 - 1.1 cm    LVIDs 3.43 2.1 - 4.0 cm    FS 23 28 - 44 %    LV mass 186.43 g    LA size 4.00 cm    RVDD 3.58 cm    TAPSE 2.26 cm    Left Ventricle Relative Wall Thickness 0.47 cm    AV mean gradient 3 mmHg    AV valve area 1.89 cm2    AV Velocity Ratio 0.69     AV index (prosthetic) 0.60     MV mean gradient 2 mmHg    MV valve area by continuity eq 2.09 cm2    E/A ratio 0.96     Mean e' 0.07 m/s    E wave deceleration time 193.00 msec    LVOT diameter 2.00 cm    LVOT area 3.1 cm2    LVOT peak hunter 0.89 m/s    LVOT peak VTI 12.70 cm    Ao peak hunter 1.29 m/s    Ao VTI 21.1 cm    LVOT stroke volume 39.88 cm3    AV peak gradient 7 mmHg    MV peak gradient 3 mmHg    E/E' ratio 11.29 m/s    MV Peak E Hunter 0.79 m/s    MV VTI 19.1 cm    MV Peak A Hunter 0.82 m/s    LV Systolic Volume 48.50 mL    LV Systolic Volume Index 23.0 mL/m2    LV Diastolic Volume 89.10 mL    LV Diastolic Volume Index 42.23 mL/m2    LV Mass Index 88 g/m2    RA Major Axis 4.38 cm    LA Volume Index (Mod) 20.4 mL/m2    LA volume (mod) 43.10 cm3    RA Width 3.07 cm    Right Atrial Pressure (from IVC) 3 mmHg   POCT Glucose, Hand-Held Device    Collection Time: 03/09/23  8:34 PM   Result Value Ref Range    POC Glucose 88 70 - 110 MG/DL   POCT glucose    Collection Time: 03/09/23  8:34 PM   Result Value Ref Range    POCT Glucose 88 70 - 110 mg/dL   Basic Metabolic Panel    Collection Time: 03/10/23  5:15 AM   Result Value Ref Range    Sodium Level 135 (L) 136 - 145 mmol/L    Potassium Level 3.7 3.5 - 5.1 mmol/L    Chloride 104 98 - 107 mmol/L    Carbon Dioxide 21 (L) 23 - 31 mmol/L    Glucose Level 95 82 - 115 mg/dL    Blood Urea Nitrogen 45.8 (H) 8.4 - 25.7 mg/dL    Creatinine 1.41 (H) 0.73 - 1.18 mg/dL    BUN/Creatinine Ratio 32     Calcium Level Total 8.7 (L) 8.8 - 10.0 mg/dL     Anion Gap 10.0 mEq/L    eGFR 52 mls/min/1.73/m2   CBC with Differential    Collection Time: 03/10/23  5:15 AM   Result Value Ref Range    WBC 11.2 4.5 - 11.5 x10(3)/mcL    RBC 3.91 (L) 4.70 - 6.10 x10(6)/mcL    Hgb 12.2 (L) 14.0 - 18.0 g/dL    Hct 35.5 (L) 42.0 - 52.0 %    MCV 90.8 80.0 - 94.0 fL    MCH 31.2 pg    MCHC 34.4 33.0 - 36.0 g/dL    RDW 13.2 11.5 - 17.0 %    Platelet 253 130 - 400 x10(3)/mcL    MPV 10.8 (H) 7.4 - 10.4 fL    Neut % 73.6 %    Lymph % 13.0 %    Mono % 11.3 %    Eos % 1.3 %    Basophil % 0.2 %    Lymph # 1.45 0.6 - 4.6 x10(3)/mcL    Neut # 8.21 2.1 - 9.2 x10(3)/mcL    Mono # 1.26 0.1 - 1.3 x10(3)/mcL    Eos # 0.14 0 - 0.9 x10(3)/mcL    Baso # 0.02 0 - 0.2 x10(3)/mcL    IG# 0.07 (H) 0 - 0.04 x10(3)/mcL    IG% 0.6 %    NRBC% 0.0 %       Significant Imaging:  Imaging Results    None       Physical Exam  HENT:      Mouth/Throat:      Mouth: Mucous membranes are moist.   Cardiovascular:      Rate and Rhythm: Normal rate and regular rhythm.      Pulses: Normal pulses.      Heart sounds: Normal heart sounds.   Pulmonary:      Effort: Pulmonary effort is normal.      Breath sounds: Normal breath sounds.   Abdominal:      Palpations: Abdomen is soft.   Musculoskeletal:         General: Normal range of motion.   Skin:     General: Skin is warm.      Capillary Refill: Capillary refill takes less than 2 seconds.   Neurological:      General: No focal deficit present.      Mental Status: He is alert.   Psychiatric:         Mood and Affect: Mood normal.       Home Medications:   No current facility-administered medications on file prior to encounter.     Current Outpatient Medications on File Prior to Encounter   Medication Sig Dispense Refill    albuterol (PROVENTIL/VENTOLIN HFA) 90 mcg/actuation inhaler Inhale 1 puff into the lungs every 4 to 6 hours as needed.      amLODIPine (NORVASC) 10 MG tablet Take 1 tablet by mouth once daily.      aspirin 81 MG Chew Take 1 tablet by mouth once daily.       cyanocobalamin (VITAMIN B-12) 1000 MCG tablet Take 1,000 mcg by mouth once daily.      fenofibrate 160 MG Tab Take 1 tablet by mouth once daily.      fluticasone propionate (FLONASE) 50 mcg/actuation nasal spray 2 sprays by Each Nostril route once daily.      lisinopriL (PRINIVIL,ZESTRIL) 20 MG tablet Take 1 tablet by mouth once daily.      pantoprazole (PROTONIX) 40 MG tablet Take 40 mg by mouth once daily.      predniSONE (DELTASONE) 10 MG tablet Take 10 mg by mouth once daily.      tiZANidine (ZANAFLEX) 4 MG tablet Take 4 mg by mouth after meals as needed.      valACYclovir (VALTREX) 1000 MG tablet Take 1 tablet by mouth once daily.         Current Inpatient Medications:    Current Facility-Administered Medications:     0.9%  NaCl infusion, 100 mL, Intravenous, Continuous, Luís Marie MD    acetaminophen tablet 650 mg, 650 mg, Oral, Q4H PRN, Luís Marie MD    aspirin EC tablet 81 mg, 81 mg, Oral, Daily, Luís Marie MD, 81 mg at 03/10/23 0809    atorvastatin tablet 40 mg, 40 mg, Oral, QHS, Luís Marie MD    hydrALAZINE injection 10 mg, 10 mg, Intravenous, Q4H PRN, Luís Marie MD    HYDROcodone-acetaminophen 5-325 mg per tablet 1 tablet, 1 tablet, Oral, Q4H PRN, Luís Marie MD    metoprolol succinate (TOPROL-XL) 24 hr tablet 25 mg, 25 mg, Oral, Daily, Luís Marie MD, 25 mg at 03/10/23 0810    morphine injection 2 mg, 2 mg, Intravenous, Q4H PRN, Luís Marie MD    ondansetron disintegrating tablet 8 mg, 8 mg, Oral, Q8H PRN, Luís Marie MD    ticagrelor tablet 90 mg, 90 mg, Oral, BID, ERICK Becerra, 90 mg at 03/10/23 0809    tirofiban 12.5 mg in sodium chloride 0.9% 250 mL infusion, , , Continuous PRN, Luís Marie MD, Last Rate: 8 mL/hr at 03/09/23 1203, 0.075 mcg/kg/min at 03/09/23 1203         VTE Risk Mitigation (From admission, onward)      None            Assessment:   Inferior STEMI  Native CAD  --[facility] 3.9.23: OM 2 70-80% long tubular stenosis. % occlusion of proximal PLB. Ostial PDA  80% stenosis. midPDA 50% stenosis and ostial 40-50% stenosis.  AV dissociation  Leukocytosis  --resolved  Hyponatremia  --improving  ERNESTO  --resolving  Diarrhea    Plan:   Continue Brilinta 90 mg bid, aspirin 81 mg daily, and atorvastatin 80 mg daily  Hold Metoprolol succinate due to AV dissociation  Monitor renal function  Obtain repeat EKG   Patient will need to follow-up with primary cardiologist to address residual OM2 and PDA stenosis  Consult hospital medicine for diarrhea and low grade fever overnight        ERICK Becerra  Cardiology  [facility] - Cath Lab Services  03/10/2023 12:59 PM

## 2023-03-10 NOTE — PLAN OF CARE
03/10/23 1321   Discharge Assessment   Assessment Type Discharge Planning Assessment   Confirmed/corrected address, phone number and insurance Yes   Confirmed Demographics Correct on Facesheet   Source of Information patient;family  (Daughter: Regine Zhao [phone])   Communicated LUISANA with patient/caregiver Date not available/Unable to determine   Reason For Admission Dx: c/o chest pain worsened with eating. Patient reports esophageal spasms x 1 week with difficulty getting liquids/solids down. EKG obtained revealed inferior STEMI; therefore he was transferred to [facility] for cath lab services. Cath lab: [facility] revealing 100% RPL which was reduced to 0% post PTA/Stent with residual. Referral to [facility] for STEMI.   People in Home spouse   Facility Arrived From: [facility]. Permanent home address is: [place], [place].   Do you expect to return to your current living situation? Yes   Do you have help at home or someone to help you manage your care at home? Yes   Who are your caregiver(s) and their phone number(s)? Wife and Daughter: Regine Zhao: [phone]   Prior to hospitilization cognitive status: Alert/Oriented   Current cognitive status: Alert/Oriented   Home Accessibility   (Lives in a (1) level home built on a slab.)   Home Layout Able to live on 1st floor   Equipment Currently Used at Home cane, straight;walker, standard;glucometer   Readmission within 30 days? No   Patient currently being followed by outpatient case management? No   Do you currently have service(s) that help you manage your care at home? No   Do you take prescription medications? Yes   Do you have prescription coverage? Yes   Coverage Payor:  [facility] MEDICARE COMPLETE   Do you have any problems affording any of your prescribed medications? No   Is the patient taking medications as prescribed? yes   Who is going to help you get home at discharge? Wife and Daughter:  Regine Zhao [phone].   How do you get to doctors appointments? car, drives self   Are you on dialysis? No   Do you take coumadin? No   Discharge Plan A Home with family  (Daughter & Son-in-law will drtive him home to his permanent/private residence in [place] upon D/C.)   Discharge Plan B Home with family   DME Needed Upon Discharge  none   Discharge Plan discussed with: Patient;Adult children  (Daughter: Regine Zhao [phone].)   Discharge Barriers Identified None   Social Connections   Are you , , , , never , or living with a partner?    Alcohol Use   Q1: How often do you have a drink containing alcohol? 2-3 per wk   Q2: How many drinks containing alcohol do you have on a typical day when you are drinking? 1 or 2   Q3: How often do you have six or more drinks on one occasion? Never   OTHER   Name(s) of People in Home Wife:

## 2023-03-10 NOTE — CONSULTS
[facility]  Hospital Medicine Consultation Note        Patient Name: Reggie Cao  MRN: [id number]  Patient Class: IP- Inpatient   Admission Date: 3/9/2023 10:58 AM  Length of Stay: 1  Attending Physician: Dr. Luís Contreras   Primary Care Provider: Tong Escobar MD  Face-to-Face encounter date: 03/10/2023   Consulting Physician: Hospital Medicine   Reason for Consult:  Diarrhea and bloating         Patient information was obtained from patient, patient's family, past medical records.    HISTORY OF PRESENT ILLNESS:   Reggie Cao is a 77 y.o. male with a past medical history of essential hypertension, hyperlipidemia, CAD, and GERD admitted to [facility] on 3/9/2023 transferred from [facility] for inferior STEMI.  Upon arrival to [facility] patient was taken to the cath lab.  Left heart catheterization revealed 100% occlusion of proximal PLB.  RCA balloon stent was placed. Overnight patient had one documented temperature of a 100.1° F. Patient complains of abdominal bloating with decreased appetite for the past 5 days.  Patient reports he takes Protonix at home.  Patient states diarrhea and abdominal bloating began yesterday after eating.  Patient reports 5 total episodes of diarrhea.  Patient denies rectal bleeding and dark/tarry stools.  Patient also endorses intermittent nausea. Hospital medicine was consulted for diarrhea and bloating.    PAST MEDICAL HISTORY:   Essential hypertension  Hyperlipidemia  CAD  GERD    PAST SURGICAL HISTORY:   C with PCI   Shoulder surgery    ALLERGIES:   Patient has no known allergies.    FAMILY HISTORY:   Mother: Liver cancer, CHF  Father: MI    SOCIAL HISTORY:   Former tobacco use  Drinks 4 alcoholic beverages weekly   Denies illicit drug use     HOME MEDICATIONS:     Prior to Admission medications    Medication Sig Start Date End Date Taking? Authorizing Provider   albuterol (PROVENTIL/VENTOLIN HFA) 90 mcg/actuation inhaler Inhale 1 puff into the  lungs every 4 to 6 hours as needed.    Historical Provider   amLODIPine (NORVASC) 10 MG tablet Take 1 tablet by mouth once daily.    Historical Provider   aspirin 81 MG Chew Take 1 tablet by mouth once daily.    Historical Provider   cyanocobalamin (VITAMIN B-12) 1000 MCG tablet Take 1,000 mcg by mouth once daily. 10/17/22   Historical Provider   fenofibrate 160 MG Tab Take 1 tablet by mouth once daily.    Historical Provider   fluticasone propionate (FLONASE) 50 mcg/actuation nasal spray 2 sprays by Each Nostril route once daily. 10/14/22 6/8/23  Historical Provider   lisinopriL (PRINIVIL,ZESTRIL) 20 MG tablet Take 1 tablet by mouth once daily. 6/27/22 6/28/23  Historical Provider   pantoprazole (PROTONIX) 40 MG tablet Take 40 mg by mouth once daily. 1/5/23   Historical Provider   predniSONE (DELTASONE) 10 MG tablet Take 10 mg by mouth once daily. 11/18/22   Historical Provider   tiZANidine (ZANAFLEX) 4 MG tablet Take 4 mg by mouth after meals as needed. 1/24/23   Historical Provider   valACYclovir (VALTREX) 1000 MG tablet Take 1 tablet by mouth once daily. 1/29/23   Historical Provider       REVIEW OF SYSTEMS:   Except as documented, all other systems reviewed and negative     PHYSICAL EXAM:     VITAL SIGNS: 24 HRS MIN & MAX LAST   Temp  Min: 98.4 °F (36.9 °C)  Max: 100.1 °F (37.8 °C) 98.4 °F (36.9 °C)   BP  Min: 89/66  Max: 149/91 123/69   Pulse  Min: 70  Max: 85  70   Resp  Min: 17  Max: 30 20   SpO2  Min: 94 %  Max: 97 % 95 %       Vitals Reviewed  General appearance: Male in no apparent distress.  HENT: Atraumatic head.   Eyes: Normal extraocular movements.   Neck: Supple.   Lungs: Clear to auscultation bilaterally.   Heart: Regular rate and rhythm.   Abdomen: Soft, mild abdominal distention. Normal bowel sounds.  Generalized tenderness to palpation.  Extremities: No cyanosis, clubbing, or edema.  Skin: No Rash.   Neuro: Motor and sensory exams grossly intact.   Psych/mental status: Appropriate mood and  affect. Responds appropriately to questions.     LABS AND IMAGING:     Recent Labs   Lab 03/09/23  1015 03/10/23  0515   WBC 14.1* 11.2   RBC 4.67* 3.91*   HGB 14.3 12.2*   HCT 42.6 35.5*   MCV 91.2 90.8   MCH 30.6 31.2   MCHC 33.6 34.4   RDW 12.7 13.2    253   MPV 10.5* 10.8*       Recent Labs   Lab 03/09/23  1015 03/10/23  0515   * 135*   K 4.1 3.7   CO2 25 21*   BUN 52.1* 45.8*   CREATININE 2.51* 1.41*   CALCIUM 10.3* 8.7*   ALBUMIN 3.4  --    ALKPHOS 40  --    ALT 32  --    AST 57*  --    BILITOT 0.8  --         Microbiology Results (last 7 days)       ** No results found for the last 168 hours. **               ASSESSMENT & PLAN:   Assessment:  Diarrhea  Abdominal pain/bloating  GERD  ERNESTO, improving  Inferior STEMI   CAD s/p PCI  Essential hypertension   Hyperlipidemia    Plan:  C diff, fecal leukocytes, and stool cultures pending  Simethicone TID PRN  X-ray abdomen, follow-up results  Protonix 40 daily  CO anti-emetics   Gentle IV fluid hydration  Resume appropriate home medications     Thank you for the consult!     VTE Prophylaxis: Already on Brilinta    __________________________________________________________________________  INPATIENT LIST OF MEDICATIONS     Current Facility-Administered Medications:     acetaminophen tablet 650 mg, 650 mg, Oral, Q4H PRN, Luís Marie MD    aspirin EC tablet 81 mg, 81 mg, Oral, Daily, Luís Marie MD, 81 mg at 03/10/23 0809    atorvastatin tablet 40 mg, 40 mg, Oral, QHS, Luís Marie MD    hydrALAZINE injection 10 mg, 10 mg, Intravenous, Q4H PRN, Luís Marie MD    HYDROcodone-acetaminophen 5-325 mg per tablet 1 tablet, 1 tablet, Oral, Q4H PRN, Luís Marie MD    morphine injection 2 mg, 2 mg, Intravenous, Q4H PRN, Luís Marie MD    ondansetron disintegrating tablet 8 mg, 8 mg, Oral, Q8H PRN, Luís Marie MD    ticagrelor tablet 90 mg, 90 mg, Oral, BID, Geeta Gibson, FNP, 90 mg at 03/10/23 0809    tirofiban 12.5 mg in sodium chloride 0.9% 250 mL  infusion, , , Continuous PRN, Luís Marie MD, Last Rate: 8 mL/hr at 03/09/23 1203, 0.075 mcg/kg/min at 03/09/23 1203      Scheduled Meds:   aspirin  81 mg Oral Daily    atorvastatin  40 mg Oral QHS    ticagrelor  90 mg Oral BID     Continuous Infusions:   tirofiban-0.9% sodium chloride 12.5 mg/250ml 0.075 mcg/kg/min (03/09/23 1203)     PRN Meds:.acetaminophen, hydrALAZINE, HYDROcodone-acetaminophen, morphine, ondansetron, tirofiban-0.9% sodium chloride 12.5 mg/250ml    RADIOLOGY                                                                                                    Echo  · The left ventricle is normal in size with concentric remodeling and low   normal systolic function.  · LVEF 50-55% with inferior wall hypokinesis.  · Normal left ventricular diastolic function.  · Normal right ventricular size with normal right ventricular systolic   function.  · Mild mitral regurgitation.     Cardiac catheterization  Narrative:   The 1st RPL lesion was 100% stenosed with 0% stenosis   post-intervention.    A STENT SYNERGY XD [id number] stent was successfully placed at 11 DELPHINE for   10 sec.    The estimated blood loss was <50 mL.    This was a successful PCI for acute myocardial infarction.    Patient brought to catheterization lab in a fasting state.  Placed on   table and prepped in the usual sterile fashion.  Time-out was completed.    Right wrist was anesthetized with 1% lidocaine.  Via the Seldinger   technique and ultrasound guidance, a slender sheath was placed over wire   and flushed with a radial cocktail mix.  Tiger 4 catheter was then engaged   into left ventricular cavity hemodynamics were measured.  The gradient was   measured on pullback.  Then engaged to left main and RCA.  Stenosis was   noted in the circumflex however the RCA was completely occluded distally   obese.  JR4 guide was engaged into the RCA.  Runthrough distally.  5 mm   balloon angioplasty was performed which restored flow.  The flow was a    trickle paste.  Subsequent balloon angioplasty was performed along with   [place].  Sluggish flow remained due to poor outflow.  Intravascular   ultrasound was performed.  At that time balloon angioplasty resulted in a   distal RCA-possibly flow-limiting.  2.5 mm x 32 mm synergy stent was   deployed successfully.  Deployed at 2.5 mm.  3.5 mm x 8 mm noncompliant   balloon was then used to post dilate the entire distally up to 3.0 mm and   then proximally up to 3.6 mm.  Angiographically showed improved flow some   distal PLB flow.  A Kindra dual-lumen catheter was then placed which the   tip was placed into the proximal PLB branch.  Aggrastat was given prior to   this.  This was intracoronary.  Adenosine 75 mcg was given along with 100   mcg of intracoronary nitroglycerin the guide was disengaged slightly.    Angiogram was then performed which showed restoration of flow.  Distally   flow improved to ANIYA 2.  At that time all wires and catheters were   removed.  A TR band was placed over the access point to achieve   hemostasis.  A small hematoma did develop some secondary TR band was given   a loading dose of Brilinta and transported to the postop area in a stable   condition      Complications:  None   Contrast load: 150cc    Findings:   Left main:  Large caliber vessel which bifurcates into the circumflex and   LAD.  No significant disease noted.  Intravascular ultrasound showed 10%   stenosis circumferentially.  Left anterior descending:  Moderate caliber vessel with luminal   irregularities.  Small caliber diagonal branch D1 and D2 noted.  Circumflex:  Moderate caliber vessel.  True circumflex courses down the AV   groove which is trivial size.  Om 2 proximally has a 70-80% long tubular   stenosis.  RCA:  Large caliber dominant vessel with noted 100% occlusion of the   proximal PLB.  The PDA also has an ostial 80-lesion.  Of note,   intravascular ultrasound did show midvessel 50% stenosis, an ostial 40-50%    LVEDP:  13 mmHg  Aortic valve:  Stenosis    Intervention:  - RCA:  Proximal PLB-culprit vessel.  2.5 mm balloon angioplasty.  2.5 mm   x 32 mm synergy stent.  Post dilated with a 3.5 mm x 8 mm, with IVUS   guidance, 3.0 distally and 3.61 mm proximally with serial increasing   diameters throughout.  Intracoronary, focal adenosine and nitroglycerin   given within the PLB branch. Restoration of ANIYA status from 0 to 3  Impression: Successful intervention of the 100% occluded proximal PLB   Residual 70-80% proximal OM2 (not intervened due to elevated renal   function and contrast up to 150 cc)  Normal Left Ventricular end-diastolic pressure   No aortic stenosis    Plan:   Routine postop care  Transfer to telemetry check echo   Check echocardiogram   Start aspirin, Brilinta, metoprolol and Lipitor.  EKG on floor arrival  Aggrastat drip for 12 hours          I, Jame Howell PA-C, have reviewed and discussed the case with Dr. Espinoza  Please see the following addendum for further assessment and plan from there attending MD.    03/10/2023    ______________________________________________________________________________    MD Addendum:    I, Dr Pollard assumed care of this patient today 5 pm  For the patient encounter, I performed the substantive portion of the visit, I reviewed the NPPA documentation, treatment plan, and medical decision making.  I had face to face time with this patient       A. History:  Patient c/o 1 episode of loose stool today and few yesterday,   Has mild generalized abdominal pain and passing gas  No vomiting, fever or chills  He was admitted for a NSTEMI and is s/p LHC and stent placement   B. Physical exam:  Heart RRR  Lungs clear  Abdomen soft and non tender   No FND   C. Medical decision making:  Patients labs and vitals reviewed  Personally reviewed the XR and other images and I agree with the radiology interpretation     Will add ppi and maalox  F/U on stool studies   No indication for  antibiotics for now     Will continue to f/u on the patient   Thank you for the consult, will be happy to follow alongside you      All diagnosis and differential diagnosis have been reviewed; assessment and plan has been documented; I have personally reviewed the labs and test results that are presently available; I have reviewed the patients medication list; I have reviewed the consulting providers response and recommendations. I have reviewed or attempted to review medical records based upon their availability.    All of the patient and family questions have been addressed and answered. Patient's is agreeable to the above stated plan. I will continue to monitor closely and make adjustments to medical management as needed.      Hospital Medicine Team  03/10/2023

## 2023-03-11 LAB
ANION GAP SERPL CALC-SCNC: 9 MEQ/L
BASOPHILS # BLD AUTO: 0.03 X10(3)/MCL (ref 0–0.2)
BASOPHILS NFR BLD AUTO: 0.4 %
BUN SERPL-MCNC: 31.1 MG/DL (ref 8.4–25.7)
CALCIUM SERPL-MCNC: 9.3 MG/DL (ref 8.8–10)
CHLORIDE SERPL-SCNC: 105 MMOL/L (ref 98–107)
CO2 SERPL-SCNC: 21 MMOL/L (ref 23–31)
CREAT SERPL-MCNC: 1.16 MG/DL (ref 0.73–1.18)
CREAT/UREA NIT SERPL: 27
EOSINOPHIL # BLD AUTO: 0.26 X10(3)/MCL (ref 0–0.9)
EOSINOPHIL NFR BLD AUTO: 3.3 %
ERYTHROCYTE [DISTWIDTH] IN BLOOD BY AUTOMATED COUNT: 13.1 % (ref 11.5–17)
FECAL LEUKOCYTE (OHS): NEGATIVE
GFR SERPLBLD CREATININE-BSD FMLA CKD-EPI: >60 MLS/MIN/1.73/M2
GLUCOSE SERPL-MCNC: 93 MG/DL (ref 82–115)
HCT VFR BLD AUTO: 35.1 % (ref 42–52)
HGB BLD-MCNC: 12 G/DL (ref 14–18)
IMM GRANULOCYTES # BLD AUTO: 0.03 X10(3)/MCL (ref 0–0.04)
IMM GRANULOCYTES NFR BLD AUTO: 0.4 %
LEUKO NEG CONT (OHS): NEGATIVE
LEUKO POS CONT (OHS): POSITIVE
LYMPHOCYTES # BLD AUTO: 1.84 X10(3)/MCL (ref 0.6–4.6)
LYMPHOCYTES NFR BLD AUTO: 23.4 %
MCH RBC QN AUTO: 31.1 PG
MCHC RBC AUTO-ENTMCNC: 34.2 G/DL (ref 33–36)
MCV RBC AUTO: 90.9 FL (ref 80–94)
MONOCYTES # BLD AUTO: 1.11 X10(3)/MCL (ref 0.1–1.3)
MONOCYTES NFR BLD AUTO: 14.1 %
NEUTROPHILS # BLD AUTO: 4.61 X10(3)/MCL (ref 2.1–9.2)
NEUTROPHILS NFR BLD AUTO: 58.4 %
NRBC BLD AUTO-RTO: 0 %
PLATELET # BLD AUTO: 291 X10(3)/MCL (ref 130–400)
PMV BLD AUTO: 10.7 FL (ref 7.4–10.4)
POCT GLUCOSE: 89 MG/DL (ref 70–110)
POCT GLUCOSE: 92 MG/DL (ref 70–110)
POTASSIUM SERPL-SCNC: 4.2 MMOL/L (ref 3.5–5.1)
RBC # BLD AUTO: 3.86 X10(6)/MCL (ref 4.7–6.1)
SODIUM SERPL-SCNC: 135 MMOL/L (ref 136–145)
WBC # SPEC AUTO: 7.9 X10(3)/MCL (ref 4.5–11.5)

## 2023-03-11 PROCEDURE — 85025 COMPLETE CBC W/AUTO DIFF WBC: CPT | Performed by: NURSE PRACTITIONER

## 2023-03-11 PROCEDURE — 93010 EKG 12-LEAD: ICD-10-PCS | Mod: ,,, | Performed by: INTERNAL MEDICINE

## 2023-03-11 PROCEDURE — 89055 LEUKOCYTE ASSESSMENT FECAL: CPT | Performed by: PHYSICIAN ASSISTANT

## 2023-03-11 PROCEDURE — 25000003 PHARM REV CODE 250: Performed by: INTERNAL MEDICINE

## 2023-03-11 PROCEDURE — 93005 ELECTROCARDIOGRAM TRACING: CPT

## 2023-03-11 PROCEDURE — 93010 ELECTROCARDIOGRAM REPORT: CPT | Mod: ,,, | Performed by: INTERNAL MEDICINE

## 2023-03-11 PROCEDURE — 80048 BASIC METABOLIC PNL TOTAL CA: CPT | Performed by: NURSE PRACTITIONER

## 2023-03-11 PROCEDURE — 25000003 PHARM REV CODE 250: Performed by: NURSE PRACTITIONER

## 2023-03-11 PROCEDURE — 21400001 HC TELEMETRY ROOM

## 2023-03-11 PROCEDURE — 87045 FECES CULTURE AEROBIC BACT: CPT | Performed by: PHYSICIAN ASSISTANT

## 2023-03-11 PROCEDURE — 25000003 PHARM REV CODE 250: Performed by: PHYSICIAN ASSISTANT

## 2023-03-11 RX ORDER — CETIRIZINE HYDROCHLORIDE 10 MG/1
10 TABLET ORAL DAILY
Status: DISCONTINUED | OUTPATIENT
Start: 2023-03-11 | End: 2023-03-14 | Stop reason: HOSPADM

## 2023-03-11 RX ORDER — LOSARTAN POTASSIUM 25 MG/1
25 TABLET ORAL DAILY
Status: DISCONTINUED | OUTPATIENT
Start: 2023-03-11 | End: 2023-03-14 | Stop reason: HOSPADM

## 2023-03-11 RX ORDER — MAG HYDROX/ALUMINUM HYD/SIMETH 200-200-20
30 SUSPENSION, ORAL (FINAL DOSE FORM) ORAL EVERY 6 HOURS PRN
Status: DISCONTINUED | OUTPATIENT
Start: 2023-03-11 | End: 2023-03-14 | Stop reason: HOSPADM

## 2023-03-11 RX ORDER — SIMETHICONE 80 MG
1 TABLET,CHEWABLE ORAL
Status: DISCONTINUED | OUTPATIENT
Start: 2023-03-11 | End: 2023-03-14 | Stop reason: HOSPADM

## 2023-03-11 RX ADMIN — SIMETHICONE 80 MG: 80 TABLET, CHEWABLE ORAL at 08:03

## 2023-03-11 RX ADMIN — CETIRIZINE HYDROCHLORIDE 10 MG: 10 TABLET, FILM COATED ORAL at 12:03

## 2023-03-11 RX ADMIN — ASPIRIN 81 MG: 81 TABLET, COATED ORAL at 09:03

## 2023-03-11 RX ADMIN — TICAGRELOR 90 MG: 90 TABLET ORAL at 09:03

## 2023-03-11 RX ADMIN — TICAGRELOR 90 MG: 90 TABLET ORAL at 08:03

## 2023-03-11 RX ADMIN — ATORVASTATIN CALCIUM 40 MG: 40 TABLET, FILM COATED ORAL at 08:03

## 2023-03-11 RX ADMIN — LOSARTAN POTASSIUM 25 MG: 25 TABLET, FILM COATED ORAL at 11:03

## 2023-03-11 RX ADMIN — ALUMINUM HYDROXIDE, MAGNESIUM HYDROXIDE, AND SIMETHICONE 30 ML: 200; 200; 20 SUSPENSION ORAL at 05:03

## 2023-03-11 RX ADMIN — PANTOPRAZOLE SODIUM 40 MG: 40 TABLET, DELAYED RELEASE ORAL at 09:03

## 2023-03-11 RX ADMIN — SIMETHICONE 80 MG: 80 TABLET, CHEWABLE ORAL at 11:03

## 2023-03-11 NOTE — PROGRESS NOTES
[facility] - Cath Lab Services  Cardiology  Progress Note    Patient Name: Reggie Cao  MRN: [id number]  Admission Date: 3/9/2023  Hospital Length of Stay: 2 days  Code Status: No Order   Attending Provider: Luís Marie MD   Consulting Provider: ERICK Becerra  Primary Care Physician: Tong Escobar MD  Principal Problem:<principal problem not specified>    Patient information was obtained from patient, past medical records, and ER records.     Subjective:     Chief Complaint:  chest pain    HPI:   Mr. Cao is a 78 y/o male,unknown to [facility], who presented to outlying facility due to c/o chest pain worsened with eating. Patient reports esophageal spasms x 1 week with difficulty getting liquids/solids down. EKG obtained revealed inferior STEMI; therefore he was transferred to [facility] for cath lab services. He was brought immediately to cath lab and underwent LHC revealing 100% RPL which was reduced to 0% post PTA/Stent with residual  Patient has been admitted to [facility] for STEMI    Hospital Course:   03.10.23: Patient awake in bed. NAD. VSS. Denies Cp/SOB. Renal indices improving.   3.11.23: EKG revealing AV dissociation. VSS. Renal function has normalized. Dr Whitley reviewed cath- PDA is jailed by stent and is small vessel- unable to treat stenosis. OM is amenable to stenting    PMH: HTN  PSH: none  Family History: unknown  Social History: nonsmoker    Previous Cardiac Diagnostics:   LHC 03.09.23:  Left main:  Large caliber vessel which bifurcates into the circumflex and LAD.  No significant disease noted.  Intravascular ultrasound showed 10% stenosis circumferentially.  Left anterior descending:  Moderate caliber vessel with luminal irregularities.  Small caliber diagonal branch D1 and D2 noted.  Circumflex:  Moderate caliber vessel.  True circumflex courses down the AV groove which is trivial size.  Om 2 proximally has a 70-80% long tubular stenosis.  RCA:  Large caliber dominant vessel with  noted 100% occlusion of the proximal PLB.  The PDA also has an ostial 80-lesion.  Of note, intravascular ultrasound did show midvessel 50% stenosis, an ostial 40-50%   LVEDP:  13 mmHg  Aortic valve:  Stenosis     Intervention:  - RCA:  Proximal PLB-culprit vessel.  2.5 mm balloon angioplasty.  2.5 mm x 32 mm synergy stent.  Post dilated with a 3.5 mm x 8 mm, with IVUS guidance, 3.0 distally and 3.61 mm proximally with serial increasing diameters throughout.  Intracoronary, focal adenosine and nitroglycerin given within the PLB branch. Restoration of ANIYA status from 0 to 3     Impression:  Successful intervention of the 100% occluded proximal PLB   Residual 70-80% proximal OM2 (not intervened due to elevated renal function and contrast up to 150 cc)  Normal Left Ventricular end-diastolic pressure   No aortic stenosis    TTE 03.09.23:  The left ventricle is normal in size with concentric remodeling and low normal systolic function. LVEF 50-55% with inferior wall hypokinesis. Normal left ventricular diastolic function. Normal right ventricular size with normal right ventricular systolic function. Mild mitral regurgitation.       Review of Systems   Constitutional: Negative.    Respiratory: Negative.     Cardiovascular: Negative.    Genitourinary: Negative.    Musculoskeletal: Negative.    Skin: Negative.    Neurological: Negative.    Psychiatric/Behavioral: Negative.       Objective:     Vital Signs (Most Recent):  Temp: 98.3 °F (36.8 °C) (03/11/23 1120)  Pulse: 63 (03/11/23 1120)  Resp: 20 (03/11/23 1120)  BP: 122/62 (03/11/23 1120)  SpO2: 95 % (03/11/23 1120) Vital Signs (24h Range):  Temp:  [97.9 °F (36.6 °C)-99.5 °F (37.5 °C)] 98.3 °F (36.8 °C)  Pulse:  [61-78] 63  Resp:  [20] 20  SpO2:  [95 %-96 %] 95 %  BP: (107-132)/(62-72) 122/62     Weight: 89 kg (196 lb 3.4 oz)  Body mass index is 26.87 kg/m².    SpO2: 95 %       No intake or output data in the 24 hours ending 03/11/23 1128    Lines/Drains/Airways        Peripheral Intravenous Line  Duration                  Peripheral IV - Single Lumen 03/09/23 1900 Anterior;Distal;Left Upper Arm 1 day         Peripheral IV - Single Lumen 03/09/23 1900 Left;Posterior Hand 1 day                    Significant Labs:  Recent Results (from the past 72 hour(s))   Troponin ISTAT    Collection Time: 03/09/23  7:30 AM   Result Value Ref Range    POC Cardiac Troponin I 9.09 (H) 0.00 - 0.08 ng/mL    Sample unknown    Comprehensive metabolic panel    Collection Time: 03/09/23 10:15 AM   Result Value Ref Range    Sodium Level 130 (L) 136 - 145 mmol/L    Potassium Level 4.1 3.5 - 5.1 mmol/L    Chloride 90 (L) 98 - 107 mmol/L    Carbon Dioxide 25 23 - 31 mmol/L    Glucose Level 110 82 - 115 mg/dL    Blood Urea Nitrogen 52.1 (H) 8.4 - 25.7 mg/dL    Creatinine 2.51 (H) 0.73 - 1.18 mg/dL    Calcium Level Total 10.3 (H) 8.8 - 10.0 mg/dL    Protein Total 7.6 5.8 - 7.6 gm/dL    Albumin Level 3.4 3.4 - 4.8 g/dL    Globulin 4.2 (H) 2.4 - 3.5 gm/dL    Albumin/Globulin Ratio 0.8 (L) 1.1 - 2.0 ratio    Bilirubin Total 0.8 <=1.5 mg/dL    Alkaline Phosphatase 40 40 - 150 unit/L    Alanine Aminotransferase 32 0 - 55 unit/L    Aspartate Aminotransferase 57 (H) 5 - 34 unit/L    eGFR 26 mls/min/1.73/m2   CBC with Differential    Collection Time: 03/09/23 10:15 AM   Result Value Ref Range    WBC 14.1 (H) 4.5 - 11.5 x10(3)/mcL    RBC 4.67 (L) 4.70 - 6.10 x10(6)/mcL    Hgb 14.3 14.0 - 18.0 g/dL    Hct 42.6 42.0 - 52.0 %    MCV 91.2 80.0 - 94.0 fL    MCH 30.6 pg    MCHC 33.6 33.0 - 36.0 g/dL    RDW 12.7 11.5 - 17.0 %    Platelet 307 130 - 400 x10(3)/mcL    MPV 10.5 (H) 7.4 - 10.4 fL    Neut % 76.3 %    Lymph % 11.1 %    Mono % 12.0 %    Eos % 0.1 %    Basophil % 0.2 %    Lymph # 1.57 0.6 - 4.6 x10(3)/mcL    Neut # 10.75 (H) 2.1 - 9.2 x10(3)/mcL    Mono # 1.69 (H) 0.1 - 1.3 x10(3)/mcL    Eos # 0.01 0 - 0.9 x10(3)/mcL    Baso # 0.03 0 - 0.2 x10(3)/mcL    IG# 0.04 0 - 0.04 x10(3)/mcL    IG% 0.3 %   Echo    Collection  Time: 03/09/23  2:06 PM   Result Value Ref Range    BSA 2.12 m2    TDI SEPTAL 0.07 m/s    LV LATERAL E/E' RATIO 11.29 m/s    LV SEPTAL E/E' RATIO 11.29 m/s    EF 50 %    Left Ventricular Outflow Tract Mean Velocity 0.54 cm/s    Left Ventricular Outflow Tract Mean Gradient 1.00 mmHg    TDI LATERAL 0.07 m/s    LVIDd 4.43 3.5 - 6.0 cm    IVS 1.30 (A) 0.6 - 1.1 cm    Posterior Wall 1.04 0.6 - 1.1 cm    LVIDs 3.43 2.1 - 4.0 cm    FS 23 28 - 44 %    LV mass 186.43 g    LA size 4.00 cm    RVDD 3.58 cm    TAPSE 2.26 cm    Left Ventricle Relative Wall Thickness 0.47 cm    AV mean gradient 3 mmHg    AV valve area 1.89 cm2    AV Velocity Ratio 0.69     AV index (prosthetic) 0.60     MV mean gradient 2 mmHg    MV valve area by continuity eq 2.09 cm2    E/A ratio 0.96     Mean e' 0.07 m/s    E wave deceleration time 193.00 msec    LVOT diameter 2.00 cm    LVOT area 3.1 cm2    LVOT peak hunter 0.89 m/s    LVOT peak VTI 12.70 cm    Ao peak hunter 1.29 m/s    Ao VTI 21.1 cm    LVOT stroke volume 39.88 cm3    AV peak gradient 7 mmHg    MV peak gradient 3 mmHg    E/E' ratio 11.29 m/s    MV Peak E Hunter 0.79 m/s    MV VTI 19.1 cm    MV Peak A Hunter 0.82 m/s    LV Systolic Volume 48.50 mL    LV Systolic Volume Index 23.0 mL/m2    LV Diastolic Volume 89.10 mL    LV Diastolic Volume Index 42.23 mL/m2    LV Mass Index 88 g/m2    RA Major Axis 4.38 cm    LA Volume Index (Mod) 20.4 mL/m2    LA volume (mod) 43.10 cm3    RA Width 3.07 cm    Right Atrial Pressure (from IVC) 3 mmHg   POCT Glucose, Hand-Held Device    Collection Time: 03/09/23  8:34 PM   Result Value Ref Range    POC Glucose 88 70 - 110 MG/DL   POCT glucose    Collection Time: 03/09/23  8:34 PM   Result Value Ref Range    POCT Glucose 88 70 - 110 mg/dL   Basic Metabolic Panel    Collection Time: 03/10/23  5:15 AM   Result Value Ref Range    Sodium Level 135 (L) 136 - 145 mmol/L    Potassium Level 3.7 3.5 - 5.1 mmol/L    Chloride 104 98 - 107 mmol/L    Carbon Dioxide 21 (L) 23 - 31 mmol/L     Glucose Level 95 82 - 115 mg/dL    Blood Urea Nitrogen 45.8 (H) 8.4 - 25.7 mg/dL    Creatinine 1.41 (H) 0.73 - 1.18 mg/dL    BUN/Creatinine Ratio 32     Calcium Level Total 8.7 (L) 8.8 - 10.0 mg/dL    Anion Gap 10.0 mEq/L    eGFR 52 mls/min/1.73/m2   CBC with Differential    Collection Time: 03/10/23  5:15 AM   Result Value Ref Range    WBC 11.2 4.5 - 11.5 x10(3)/mcL    RBC 3.91 (L) 4.70 - 6.10 x10(6)/mcL    Hgb 12.2 (L) 14.0 - 18.0 g/dL    Hct 35.5 (L) 42.0 - 52.0 %    MCV 90.8 80.0 - 94.0 fL    MCH 31.2 pg    MCHC 34.4 33.0 - 36.0 g/dL    RDW 13.2 11.5 - 17.0 %    Platelet 253 130 - 400 x10(3)/mcL    MPV 10.8 (H) 7.4 - 10.4 fL    Neut % 73.6 %    Lymph % 13.0 %    Mono % 11.3 %    Eos % 1.3 %    Basophil % 0.2 %    Lymph # 1.45 0.6 - 4.6 x10(3)/mcL    Neut # 8.21 2.1 - 9.2 x10(3)/mcL    Mono # 1.26 0.1 - 1.3 x10(3)/mcL    Eos # 0.14 0 - 0.9 x10(3)/mcL    Baso # 0.02 0 - 0.2 x10(3)/mcL    IG# 0.07 (H) 0 - 0.04 x10(3)/mcL    IG% 0.6 %    NRBC% 0.0 %   Lipid Panel    Collection Time: 03/10/23  5:15 AM   Result Value Ref Range    Cholesterol Total 167 <=200 mg/dL    HDL Cholesterol 50 35 - 60 mg/dL    Triglyceride 125 34 - 140 mg/dL    Cholesterol/HDL Ratio 3 0 - 5    Very Low Density Lipoprotein 25     LDL Cholesterol 92.00 50.00 - 140.00 mg/dL   Hemoglobin A1C    Collection Time: 03/10/23  5:15 AM   Result Value Ref Range    Hemoglobin A1c 5.2 <=7.0 %    Estimated Average Glucose 102.5 mg/dL   POCT Glucose, Hand-Held Device    Collection Time: 03/10/23  8:23 PM   Result Value Ref Range    POC Glucose 107 70 - 110 MG/DL   POCT glucose    Collection Time: 03/10/23  8:23 PM   Result Value Ref Range    POCT Glucose 107 70 - 110 mg/dL   POCT glucose    Collection Time: 03/11/23  5:57 AM   Result Value Ref Range    POCT Glucose 89 70 - 110 mg/dL   Basic Metabolic Panel    Collection Time: 03/11/23  6:28 AM   Result Value Ref Range    Sodium Level 135 (L) 136 - 145 mmol/L    Potassium Level 4.2 3.5 - 5.1 mmol/L     Chloride 105 98 - 107 mmol/L    Carbon Dioxide 21 (L) 23 - 31 mmol/L    Glucose Level 93 82 - 115 mg/dL    Blood Urea Nitrogen 31.1 (H) 8.4 - 25.7 mg/dL    Creatinine 1.16 0.73 - 1.18 mg/dL    BUN/Creatinine Ratio 27     Calcium Level Total 9.3 8.8 - 10.0 mg/dL    Anion Gap 9.0 mEq/L    eGFR >60 mls/min/1.73/m2   CBC with Differential    Collection Time: 03/11/23  6:28 AM   Result Value Ref Range    WBC 7.9 4.5 - 11.5 x10(3)/mcL    RBC 3.86 (L) 4.70 - 6.10 x10(6)/mcL    Hgb 12.0 (L) 14.0 - 18.0 g/dL    Hct 35.1 (L) 42.0 - 52.0 %    MCV 90.9 80.0 - 94.0 fL    MCH 31.1 pg    MCHC 34.2 33.0 - 36.0 g/dL    RDW 13.1 11.5 - 17.0 %    Platelet 291 130 - 400 x10(3)/mcL    MPV 10.7 (H) 7.4 - 10.4 fL    Neut % 58.4 %    Lymph % 23.4 %    Mono % 14.1 %    Eos % 3.3 %    Basophil % 0.4 %    Lymph # 1.84 0.6 - 4.6 x10(3)/mcL    Neut # 4.61 2.1 - 9.2 x10(3)/mcL    Mono # 1.11 0.1 - 1.3 x10(3)/mcL    Eos # 0.26 0 - 0.9 x10(3)/mcL    Baso # 0.03 0 - 0.2 x10(3)/mcL    IG# 0.03 0 - 0.04 x10(3)/mcL    IG% 0.4 %    NRBC% 0.0 %   FECAL LEUKOCYTES - LACTOFERRIN ON  STOOL    Collection Time: 03/11/23  7:15 AM   Result Value Ref Range    Fecal Leukocyte Negative Negative    Leukocyte Positive Control Positive Positive    Leukocyte Negative Control Negative Negative       Significant Imaging:  Imaging Results    None       Physical Exam  HENT:      Mouth/Throat:      Mouth: Mucous membranes are moist.   Cardiovascular:      Rate and Rhythm: Normal rate and regular rhythm.      Pulses: Normal pulses.      Heart sounds: Normal heart sounds.   Pulmonary:      Effort: Pulmonary effort is normal.      Breath sounds: Normal breath sounds.   Abdominal:      Palpations: Abdomen is soft.   Musculoskeletal:         General: Normal range of motion.   Skin:     General: Skin is warm.      Capillary Refill: Capillary refill takes less than 2 seconds.   Neurological:      General: No focal deficit present.      Mental Status: He is alert.   Psychiatric:          Mood and Affect: Mood normal.       Home Medications:   No current facility-administered medications on file prior to encounter.     Current Outpatient Medications on File Prior to Encounter   Medication Sig Dispense Refill    albuterol (PROVENTIL/VENTOLIN HFA) 90 mcg/actuation inhaler Inhale 1 puff into the lungs every 4 to 6 hours as needed.      amLODIPine (NORVASC) 10 MG tablet Take 1 tablet by mouth once daily.      aspirin 81 MG Chew Take 1 tablet by mouth once daily.      cyanocobalamin (VITAMIN B-12) 1000 MCG tablet Take 1,000 mcg by mouth once daily.      fenofibrate 160 MG Tab Take 1 tablet by mouth once daily.      fluticasone propionate (FLONASE) 50 mcg/actuation nasal spray 2 sprays by Each Nostril route once daily.      lisinopriL (PRINIVIL,ZESTRIL) 20 MG tablet Take 1 tablet by mouth once daily.      pantoprazole (PROTONIX) 40 MG tablet Take 40 mg by mouth once daily.      predniSONE (DELTASONE) 10 MG tablet Take 10 mg by mouth once daily.      tiZANidine (ZANAFLEX) 4 MG tablet Take 4 mg by mouth after meals as needed.      valACYclovir (VALTREX) 1000 MG tablet Take 1 tablet by mouth once daily.         Current Inpatient Medications:    Current Facility-Administered Medications:     acetaminophen tablet 650 mg, 650 mg, Oral, Q4H PRN, Luís Marie MD    aluminum-magnesium hydroxide-simethicone 200-200-20 mg/5 mL suspension 30 mL, 30 mL, Oral, Q6H PRN, Tasia Bangura MD    aspirin EC tablet 81 mg, 81 mg, Oral, Daily, Luís Marie MD, 81 mg at 03/11/23 0930    atorvastatin tablet 40 mg, 40 mg, Oral, QHS, Luís Marie MD, 40 mg at 03/10/23 2024    hydrALAZINE injection 10 mg, 10 mg, Intravenous, Q4H PRN, Luís Marie MD    HYDROcodone-acetaminophen 5-325 mg per tablet 1 tablet, 1 tablet, Oral, Q4H PRN, Luís Marie MD    losartan tablet 25 mg, 25 mg, Oral, Daily, Geeta Gibson, FNP, 25 mg at 03/11/23 1120    morphine injection 2 mg, 2 mg, Intravenous, Q4H PRN, Luís Marie MD    ondansetron  disintegrating tablet 8 mg, 8 mg, Oral, Q8H PRN, Luís Marie MD    pantoprazole EC tablet 40 mg, 40 mg, Oral, Daily, Jame Howell PA-C, 40 mg at 03/11/23 0930    simethicone chewable tablet 80 mg, 1 tablet, Oral, QID (PC + HS), Tasia Bangura MD, 80 mg at 03/11/23 1120    ticagrelor tablet 90 mg, 90 mg, Oral, BID, ERICK Becerra, 90 mg at 03/11/23 0930    tirofiban 12.5 mg in sodium chloride 0.9% 250 mL infusion, , , Continuous PRN, Luís Marie MD, Last Rate: 8 mL/hr at 03/09/23 1203, 0.075 mcg/kg/min at 03/09/23 1203         VTE Risk Mitigation (From admission, onward)      None            Assessment:   Inferior STEMI  Native CAD  --[facility] 3.9.23: OM 2 70-80% long tubular stenosis. % occlusion of proximal PLB. Ostial PDA 80% stenosis. midPDA 50% stenosis and ostial 40-50% stenosis.  AV dissociation  Leukocytosis  --resolved  Hyponatremia  --improved   ERNESTO  --resolved  Diarrhea    Plan:   Continue Brilinta 90 mg bid, aspirin 81 mg daily, and atorvastatin 80 mg daily  Hold Metoprolol succinate due to AV dissociation  Will plan for repeat angiogram in am to address OM stenosis. NPO after MN  Monitor renal function          ERICK Becerra  Cardiology  [facility] -   03/11/2023

## 2023-03-11 NOTE — PROGRESS NOTES
[facility] Hospital Medicine Progress Note        Chief Complaint: Inpatient Follow-up for  diarrhea and bloating     HPI:   Reggie Cao is a 77 y.o. male with a past medical history of essential hypertension, hyperlipidemia, CAD, and GERD admitted to [facility] on 3/9/2023 transferred from [facility] for inferior STEMI.  Upon arrival to [facility] patient was taken to the cath lab.  Left heart catheterization revealed 100% occlusion of proximal PLB.  RCA balloon stent was placed. Overnight patient had one documented temperature of a 100.1° F. Patient complains of abdominal bloating with decreased appetite for the past 5 days.  Patient reports he takes Protonix at home.  Patient states diarrhea and abdominal bloating began yesterday after eating.  Patient reports 5 total episodes of diarrhea.  Patient denies rectal bleeding and dark/tarry stools.  Patient also endorses intermittent nausea. Hospital medicine was consulted for diarrhea and bloating.    Interval Hx:   Pt reports feeling better. One bowel movement this morning with soft dark brown stool . Stool for C.diff canceled. WBC normalized . No further fever reported. C/O some abdominal bloating but denies pain. Cardiology plans for repeat angio in am to address OM stenosis. BB discontinued per Cards due to EKG evidence of AV dissociation.     Objective/physical exam:  General: In no acute distress, afebrile  Chest: Clear to auscultation bilaterally  Heart: RRR, +S1, S2, no appreciable murmur  Abdomen: Slightly firm but not distended, nontender, Bowel sounds are active.   MSK: Warm, no lower extremity edema, no clubbing or cyanosis  Neurologic: Alert and oriented x4, Cranial nerve II-XII intact, Strength 5/5 in all 4 extremities    VITAL SIGNS: 24 HRS MIN & MAX LAST   Temp  Min: 97.9 °F (36.6 °C)  Max: 98.8 °F (37.1 °C) 98.3 °F (36.8 °C)   BP  Min: 107/66  Max: 132/70 122/62   Pulse  Min: 61  Max: 76  63   Resp  Min: 20  Max:  20 20   SpO2  Min: 95 %  Max: 96 % 95 %       Recent Labs   Lab 03/09/23  1015 03/10/23  0515 03/11/23  0628   WBC 14.1* 11.2 7.9   RBC 4.67* 3.91* 3.86*   HGB 14.3 12.2* 12.0*   HCT 42.6 35.5* 35.1*   MCV 91.2 90.8 90.9   MCH 30.6 31.2 31.1   MCHC 33.6 34.4 34.2   RDW 12.7 13.2 13.1    253 291   MPV 10.5* 10.8* 10.7*       Recent Labs   Lab 03/09/23  1015 03/10/23  0515 03/11/23  0628   * 135* 135*   K 4.1 3.7 4.2   CO2 25 21* 21*   BUN 52.1* 45.8* 31.1*   CREATININE 2.51* 1.41* 1.16   CALCIUM 10.3* 8.7* 9.3   ALBUMIN 3.4  --   --    ALKPHOS 40  --   --    ALT 32  --   --    AST 57*  --   --    BILITOT 0.8  --   --           Microbiology Results (last 7 days)       Procedure Component Value Units Date/Time    Stool Culture [[id number]] Collected: 03/11/23 0715    Order Status: Sent Specimen: Stool Updated: 03/11/23 0805    C. Difficile By Rapid Pcr [[id number]] Collected: 03/11/23 0715    Order Status: Canceled Specimen: Stool Updated: 03/11/23 0805             See below for Radiology    Scheduled Med:   aspirin  81 mg Oral Daily    atorvastatin  40 mg Oral QHS    cetirizine  10 mg Oral Daily    losartan  25 mg Oral Daily    pantoprazole  40 mg Oral Daily    simethicone  1 tablet Oral QID (PC + HS)    ticagrelor  90 mg Oral BID        Continuous Infusions:   tirofiban-0.9% sodium chloride 12.5 mg/250ml 0.075 mcg/kg/min (03/09/23 1203)        PRN Meds:  acetaminophen, aluminum-magnesium hydroxide-simethicone, hydrALAZINE, HYDROcodone-acetaminophen, morphine, ondansetron, tirofiban-0.9% sodium chloride 12.5 mg/250ml       Assessment/Plan:  Inferior STEMI  H/O CAD s/p PCI  AV dissociation   Essential HTN  ERNESTO- improving   Hyperlipidemia   Abdominal bloating   GERD  Leukocytosis - resolved   Acute diarrhea - improved     Plan-   No further fever reported.   Add Simethicone for abd bloating   Continue PPI  No further diarrhea reported . Stool for C.diff test discontinued   X-ray abd showed gaseous distention  of colon , no signs of bowel obstruction.  Labs revealed WBC 7.9, Hgb 12, Na 135, K 4.2, CO2 21, Cr 1.1    BB held per Cards due to AV dissociation   Continue ASA, Brilinta, high intensity statin.  Plan for repeat LHC in am to address OM stenosis.           VTE prophylaxis: SCD    Patient condition:  Fair     Anticipated discharge and Disposition:     Home     All diagnosis and differential diagnosis have been reviewed; assessment and plan has been documented; I have personally reviewed the labs and test results that are presently available; I have reviewed the patients medication list; I have reviewed the consulting providers response and recommendations. I have reviewed or attempted to review medical records based upon their availability    All of the patient's questions have been  addressed and answered. Patient's is agreeable to the above stated plan. I will continue to monitor closely and make adjustments to medical management as needed.  _____________________________________________________________________    Nutrition Status:    Radiology:  X-Ray Abdomen AP 1 View  Narrative: EXAMINATION:  XR ABDOMEN AP 1 VIEW    CLINICAL HISTORY:  abdominal pain;    TECHNIQUE:  One view    COMPARISON:  None avail    FINDINGS:  There is gastric distention of most of the colon which may represent ileus.  No significant dilatation of the small bowel loops.  Solid organs are not well seen.  Degenerative and operative changes of lumbar spine.  Demineralization of the bones.  Impression: Colonic gaseous distension may represent ileus.    Electronically signed by: Nura Ramos  Date:    03/10/2023  Time:    16:41      Tasia Bangura MD   03/11/2023

## 2023-03-12 LAB
ANION GAP SERPL CALC-SCNC: 9 MEQ/L
APTT PPP: 25.2 SECONDS (ref 23.2–33.7)
BASOPHILS # BLD AUTO: 0.03 X10(3)/MCL (ref 0–0.2)
BASOPHILS NFR BLD AUTO: 0.4 %
BUN SERPL-MCNC: 24 MG/DL (ref 8.4–25.7)
CALCIUM SERPL-MCNC: 9.3 MG/DL (ref 8.8–10)
CHLORIDE SERPL-SCNC: 104 MMOL/L (ref 98–107)
CO2 SERPL-SCNC: 23 MMOL/L (ref 23–31)
CREAT SERPL-MCNC: 1.2 MG/DL (ref 0.73–1.18)
CREAT/UREA NIT SERPL: 20
EOSINOPHIL # BLD AUTO: 0.24 X10(3)/MCL (ref 0–0.9)
EOSINOPHIL NFR BLD AUTO: 3 %
ERYTHROCYTE [DISTWIDTH] IN BLOOD BY AUTOMATED COUNT: 12.9 % (ref 11.5–17)
GFR SERPLBLD CREATININE-BSD FMLA CKD-EPI: >60 MLS/MIN/1.73/M2
GLUCOSE SERPL-MCNC: 94 MG/DL (ref 82–115)
HCT VFR BLD AUTO: 34.3 % (ref 42–52)
HGB BLD-MCNC: 11.8 G/DL (ref 14–18)
IMM GRANULOCYTES # BLD AUTO: 0.06 X10(3)/MCL (ref 0–0.04)
IMM GRANULOCYTES NFR BLD AUTO: 0.8 %
LYMPHOCYTES # BLD AUTO: 2.18 X10(3)/MCL (ref 0.6–4.6)
LYMPHOCYTES NFR BLD AUTO: 27.7 %
MCH RBC QN AUTO: 31.1 PG
MCHC RBC AUTO-ENTMCNC: 34.4 G/DL (ref 33–36)
MCV RBC AUTO: 90.5 FL (ref 80–94)
MONOCYTES # BLD AUTO: 0.98 X10(3)/MCL (ref 0.1–1.3)
MONOCYTES NFR BLD AUTO: 12.5 %
NEUTROPHILS # BLD AUTO: 4.38 X10(3)/MCL (ref 2.1–9.2)
NEUTROPHILS NFR BLD AUTO: 55.6 %
NRBC BLD AUTO-RTO: 0 %
PLATELET # BLD AUTO: 265 X10(3)/MCL (ref 130–400)
PMV BLD AUTO: 10.1 FL (ref 7.4–10.4)
POCT GLUCOSE: 95 MG/DL (ref 70–110)
POCT GLUCOSE: 95 MG/DL (ref 70–110)
POTASSIUM SERPL-SCNC: 4 MMOL/L (ref 3.5–5.1)
RBC # BLD AUTO: 3.79 X10(6)/MCL (ref 4.7–6.1)
SODIUM SERPL-SCNC: 136 MMOL/L (ref 136–145)
WBC # SPEC AUTO: 7.9 X10(3)/MCL (ref 4.5–11.5)

## 2023-03-12 PROCEDURE — C1725 CATH, TRANSLUMIN NON-LASER: HCPCS | Performed by: INTERNAL MEDICINE

## 2023-03-12 PROCEDURE — C9600 PERC DRUG-EL COR STENT SING: HCPCS | Mod: LC | Performed by: INTERNAL MEDICINE

## 2023-03-12 PROCEDURE — 85025 COMPLETE CBC W/AUTO DIFF WBC: CPT | Performed by: NURSE PRACTITIONER

## 2023-03-12 PROCEDURE — 21400001 HC TELEMETRY ROOM

## 2023-03-12 PROCEDURE — 25500020 PHARM REV CODE 255: Performed by: INTERNAL MEDICINE

## 2023-03-12 PROCEDURE — 93454 CORONARY ARTERY ANGIO S&I: CPT | Mod: 59 | Performed by: INTERNAL MEDICINE

## 2023-03-12 PROCEDURE — C1874 STENT, COATED/COV W/DEL SYS: HCPCS | Performed by: INTERNAL MEDICINE

## 2023-03-12 PROCEDURE — C1894 INTRO/SHEATH, NON-LASER: HCPCS | Performed by: INTERNAL MEDICINE

## 2023-03-12 PROCEDURE — C1887 CATHETER, GUIDING: HCPCS | Performed by: INTERNAL MEDICINE

## 2023-03-12 PROCEDURE — C1760 CLOSURE DEV, VASC: HCPCS | Performed by: INTERNAL MEDICINE

## 2023-03-12 PROCEDURE — 80048 BASIC METABOLIC PNL TOTAL CA: CPT | Performed by: NURSE PRACTITIONER

## 2023-03-12 PROCEDURE — 25000003 PHARM REV CODE 250: Performed by: INTERNAL MEDICINE

## 2023-03-12 PROCEDURE — 85730 THROMBOPLASTIN TIME PARTIAL: CPT | Performed by: NURSE PRACTITIONER

## 2023-03-12 PROCEDURE — 63600175 PHARM REV CODE 636 W HCPCS: Performed by: INTERNAL MEDICINE

## 2023-03-12 PROCEDURE — C1769 GUIDE WIRE: HCPCS | Performed by: INTERNAL MEDICINE

## 2023-03-12 PROCEDURE — 25000003 PHARM REV CODE 250: Performed by: NURSE PRACTITIONER

## 2023-03-12 DEVICE — STENT ONYXNG35015UX ONYX 3.50X15RX
Type: IMPLANTABLE DEVICE | Site: HEART | Status: FUNCTIONAL
Brand: ONYX FRONTIER™

## 2023-03-12 DEVICE — STENT ONYXNG30008UX ONYX 3.00X08RX
Type: IMPLANTABLE DEVICE | Site: HEART | Status: FUNCTIONAL
Brand: ONYX FRONTIER™

## 2023-03-12 DEVICE — STENT ONYXNG30026UX ONYX 3.00X26RX
Type: IMPLANTABLE DEVICE | Site: HEART | Status: FUNCTIONAL
Brand: ONYX FRONTIER™

## 2023-03-12 DEVICE — ANGIO-SEAL VIP VASCULAR CLOSURE DEVICE
Type: IMPLANTABLE DEVICE | Site: GROIN | Status: FUNCTIONAL
Brand: ANGIO-SEAL

## 2023-03-12 RX ORDER — FENTANYL CITRATE 50 UG/ML
INJECTION, SOLUTION INTRAMUSCULAR; INTRAVENOUS
Status: DISCONTINUED | OUTPATIENT
Start: 2023-03-12 | End: 2023-03-12 | Stop reason: HOSPADM

## 2023-03-12 RX ORDER — LIDOCAINE HYDROCHLORIDE 10 MG/ML
INJECTION INFILTRATION; PERINEURAL
Status: DISCONTINUED | OUTPATIENT
Start: 2023-03-12 | End: 2023-03-12 | Stop reason: HOSPADM

## 2023-03-12 RX ORDER — MIDAZOLAM HYDROCHLORIDE 1 MG/ML
INJECTION INTRAMUSCULAR; INTRAVENOUS
Status: DISCONTINUED | OUTPATIENT
Start: 2023-03-12 | End: 2023-03-12 | Stop reason: HOSPADM

## 2023-03-12 RX ORDER — SODIUM CHLORIDE 9 MG/ML
INJECTION, SOLUTION INTRAVENOUS CONTINUOUS
Status: ACTIVE | OUTPATIENT
Start: 2023-03-12 | End: 2023-03-12

## 2023-03-12 RX ORDER — HEPARIN SODIUM 1000 [USP'U]/ML
INJECTION, SOLUTION INTRAVENOUS; SUBCUTANEOUS
Status: DISCONTINUED | OUTPATIENT
Start: 2023-03-12 | End: 2023-03-12 | Stop reason: HOSPADM

## 2023-03-12 RX ADMIN — LOSARTAN POTASSIUM 25 MG: 25 TABLET, FILM COATED ORAL at 08:03

## 2023-03-12 RX ADMIN — ASPIRIN 81 MG: 81 TABLET, COATED ORAL at 08:03

## 2023-03-12 RX ADMIN — SIMETHICONE 80 MG: 80 TABLET, CHEWABLE ORAL at 02:03

## 2023-03-12 RX ADMIN — TICAGRELOR 90 MG: 90 TABLET ORAL at 08:03

## 2023-03-12 RX ADMIN — SODIUM CHLORIDE: 9 INJECTION, SOLUTION INTRAVENOUS at 01:03

## 2023-03-12 RX ADMIN — ATORVASTATIN CALCIUM 40 MG: 40 TABLET, FILM COATED ORAL at 09:03

## 2023-03-12 RX ADMIN — SIMETHICONE 80 MG: 80 TABLET, CHEWABLE ORAL at 09:03

## 2023-03-12 RX ADMIN — SODIUM CHLORIDE: 9 INJECTION, SOLUTION INTRAVENOUS at 08:03

## 2023-03-12 RX ADMIN — TICAGRELOR 90 MG: 90 TABLET ORAL at 09:03

## 2023-03-12 NOTE — PROGRESS NOTES
[facility] - Cath Lab Services  Cardiology  Progress Note    Patient Name: Reggie Cao  MRN: [id number]  Admission Date: 3/9/2023  Hospital Length of Stay: 3 days  Code Status: No Order   Attending Provider: Luís Marie MD   Consulting Provider: ERICK Becerra  Primary Care Physician: Tong Escobar MD  Principal Problem:<principal problem not specified>    Patient information was obtained from patient, past medical records, and ER records.     Subjective:     Chief Complaint:  chest pain    HPI:   Mr. Cao is a 78 y/o male,unknown to [facility], who presented to outlying facility due to c/o chest pain worsened with eating. Patient reports esophageal spasms x 1 week with difficulty getting liquids/solids down. EKG obtained revealed inferior STEMI; therefore he was transferred to [facility] for cath lab services. He was brought immediately to cath lab and underwent LHC revealing 100% RPL which was reduced to 0% post PTA/Stent with residual  Patient has been admitted to [facility] for STEMI    Hospital Course:   03.10.23: Patient awake in bed. NAD. VSS. Denies Cp/SOB. Renal indices improving.   3.11.23: EKG revealing AV dissociation. VSS. Renal function has normalized. Dr Whitley reviewed cath- PDA is jailed by stent and is small vessel- unable to treat stenosis. OM is amenable to stenting  3.12.23: Awake in bed. NAD. Noted Wenckebach on tele with HR down to 30s during sleep. BP stable. Denies CP. Plans for LHC today    PMH: HTN  PSH: none  Family History: unknown  Social History: nonsmoker    Previous Cardiac Diagnostics:   LHC 03.09.23:  Left main:  Large caliber vessel which bifurcates into the circumflex and LAD.  No significant disease noted.  Intravascular ultrasound showed 10% stenosis circumferentially.  Left anterior descending:  Moderate caliber vessel with luminal irregularities.  Small caliber diagonal branch D1 and D2 noted.  Circumflex:  Moderate caliber vessel.  True circumflex courses down the  AV groove which is trivial size.  Om 2 proximally has a 70-80% long tubular stenosis.  RCA:  Large caliber dominant vessel with noted 100% occlusion of the proximal PLB.  The PDA also has an ostial 80-lesion.  Of note, intravascular ultrasound did show midvessel 50% stenosis, an ostial 40-50%   LVEDP:  13 mmHg  Aortic valve:  Stenosis     Intervention:  - RCA:  Proximal PLB-culprit vessel.  2.5 mm balloon angioplasty.  2.5 mm x 32 mm synergy stent.  Post dilated with a 3.5 mm x 8 mm, with IVUS guidance, 3.0 distally and 3.61 mm proximally with serial increasing diameters throughout.  Intracoronary, focal adenosine and nitroglycerin given within the PLB branch. Restoration of ANIYA status from 0 to 3     Impression:  Successful intervention of the 100% occluded proximal PLB   Residual 70-80% proximal OM2 (not intervened due to elevated renal function and contrast up to 150 cc)  Normal Left Ventricular end-diastolic pressure   No aortic stenosis    TTE 03.09.23:  The left ventricle is normal in size with concentric remodeling and low normal systolic function. LVEF 50-55% with inferior wall hypokinesis. Normal left ventricular diastolic function. Normal right ventricular size with normal right ventricular systolic function. Mild mitral regurgitation.       Review of Systems   Constitutional: Negative.    Respiratory: Negative.     Cardiovascular: Negative.    Genitourinary: Negative.    Musculoskeletal: Negative.    Skin: Negative.    Neurological: Negative.    Psychiatric/Behavioral: Negative.       Objective:     Vital Signs (Most Recent):  Temp: 98.6 °F (37 °C) (03/12/23 0755)  Pulse: 80 (03/12/23 0755)  Resp: 18 (03/12/23 0755)  BP: 113/65 (03/12/23 0755)  SpO2: 95 % (03/12/23 0755) Vital Signs (24h Range):  Temp:  [98.1 °F (36.7 °C)-98.8 °F (37.1 °C)] 98.6 °F (37 °C)  Pulse:  [49-80] 80  Resp:  [18-20] 18  SpO2:  [94 %-97 %] 95 %  BP: (111-125)/(59-71) 113/65     Weight: 88.9 kg (195 lb 15.8 oz)  Body mass index is  26.84 kg/m².    SpO2: 95 %         Intake/Output Summary (Last 24 hours) at 3/12/2023 1109  Last data filed at 3/11/2023 1457  Gross per 24 hour   Intake 1680 ml   Output --   Net 1680 ml       Lines/Drains/Airways       Peripheral Intravenous Line  Duration                  Peripheral IV - Single Lumen 03/09/23 1900 Anterior;Distal;Left Upper Arm 2 days         Peripheral IV - Single Lumen 03/09/23 1900 Left;Posterior Hand 2 days                    Significant Labs:  Recent Results (from the past 72 hour(s))   Comprehensive metabolic panel    Collection Time: 03/09/23 10:15 AM   Result Value Ref Range    Sodium Level 130 (L) 136 - 145 mmol/L    Potassium Level 4.1 3.5 - 5.1 mmol/L    Chloride 90 (L) 98 - 107 mmol/L    Carbon Dioxide 25 23 - 31 mmol/L    Glucose Level 110 82 - 115 mg/dL    Blood Urea Nitrogen 52.1 (H) 8.4 - 25.7 mg/dL    Creatinine 2.51 (H) 0.73 - 1.18 mg/dL    Calcium Level Total 10.3 (H) 8.8 - 10.0 mg/dL    Protein Total 7.6 5.8 - 7.6 gm/dL    Albumin Level 3.4 3.4 - 4.8 g/dL    Globulin 4.2 (H) 2.4 - 3.5 gm/dL    Albumin/Globulin Ratio 0.8 (L) 1.1 - 2.0 ratio    Bilirubin Total 0.8 <=1.5 mg/dL    Alkaline Phosphatase 40 40 - 150 unit/L    Alanine Aminotransferase 32 0 - 55 unit/L    Aspartate Aminotransferase 57 (H) 5 - 34 unit/L    eGFR 26 mls/min/1.73/m2   CBC with Differential    Collection Time: 03/09/23 10:15 AM   Result Value Ref Range    WBC 14.1 (H) 4.5 - 11.5 x10(3)/mcL    RBC 4.67 (L) 4.70 - 6.10 x10(6)/mcL    Hgb 14.3 14.0 - 18.0 g/dL    Hct 42.6 42.0 - 52.0 %    MCV 91.2 80.0 - 94.0 fL    MCH 30.6 pg    MCHC 33.6 33.0 - 36.0 g/dL    RDW 12.7 11.5 - 17.0 %    Platelet 307 130 - 400 x10(3)/mcL    MPV 10.5 (H) 7.4 - 10.4 fL    Neut % 76.3 %    Lymph % 11.1 %    Mono % 12.0 %    Eos % 0.1 %    Basophil % 0.2 %    Lymph # 1.57 0.6 - 4.6 x10(3)/mcL    Neut # 10.75 (H) 2.1 - 9.2 x10(3)/mcL    Mono # 1.69 (H) 0.1 - 1.3 x10(3)/mcL    Eos # 0.01 0 - 0.9 x10(3)/mcL    Baso # 0.03 0 - 0.2  x10(3)/mcL    IG# 0.04 0 - 0.04 x10(3)/mcL    IG% 0.3 %   Echo    Collection Time: 03/09/23  2:06 PM   Result Value Ref Range    BSA 2.12 m2    TDI SEPTAL 0.07 m/s    LV LATERAL E/E' RATIO 11.29 m/s    LV SEPTAL E/E' RATIO 11.29 m/s    EF 50 %    Left Ventricular Outflow Tract Mean Velocity 0.54 cm/s    Left Ventricular Outflow Tract Mean Gradient 1.00 mmHg    TDI LATERAL 0.07 m/s    LVIDd 4.43 3.5 - 6.0 cm    IVS 1.30 (A) 0.6 - 1.1 cm    Posterior Wall 1.04 0.6 - 1.1 cm    LVIDs 3.43 2.1 - 4.0 cm    FS 23 28 - 44 %    LV mass 186.43 g    LA size 4.00 cm    RVDD 3.58 cm    TAPSE 2.26 cm    Left Ventricle Relative Wall Thickness 0.47 cm    AV mean gradient 3 mmHg    AV valve area 1.89 cm2    AV Velocity Ratio 0.69     AV index (prosthetic) 0.60     MV mean gradient 2 mmHg    MV valve area by continuity eq 2.09 cm2    E/A ratio 0.96     Mean e' 0.07 m/s    E wave deceleration time 193.00 msec    LVOT diameter 2.00 cm    LVOT area 3.1 cm2    LVOT peak hunter 0.89 m/s    LVOT peak VTI 12.70 cm    Ao peak hunter 1.29 m/s    Ao VTI 21.1 cm    LVOT stroke volume 39.88 cm3    AV peak gradient 7 mmHg    MV peak gradient 3 mmHg    E/E' ratio 11.29 m/s    MV Peak E Hunter 0.79 m/s    MV VTI 19.1 cm    MV Peak A Hunter 0.82 m/s    LV Systolic Volume 48.50 mL    LV Systolic Volume Index 23.0 mL/m2    LV Diastolic Volume 89.10 mL    LV Diastolic Volume Index 42.23 mL/m2    LV Mass Index 88 g/m2    RA Major Axis 4.38 cm    LA Volume Index (Mod) 20.4 mL/m2    LA volume (mod) 43.10 cm3    RA Width 3.07 cm    Right Atrial Pressure (from IVC) 3 mmHg   POCT Glucose, Hand-Held Device    Collection Time: 03/09/23  8:34 PM   Result Value Ref Range    POC Glucose 88 70 - 110 MG/DL   POCT glucose    Collection Time: 03/09/23  8:34 PM   Result Value Ref Range    POCT Glucose 88 70 - 110 mg/dL   Basic Metabolic Panel    Collection Time: 03/10/23  5:15 AM   Result Value Ref Range    Sodium Level 135 (L) 136 - 145 mmol/L    Potassium Level 3.7 3.5 - 5.1  mmol/L    Chloride 104 98 - 107 mmol/L    Carbon Dioxide 21 (L) 23 - 31 mmol/L    Glucose Level 95 82 - 115 mg/dL    Blood Urea Nitrogen 45.8 (H) 8.4 - 25.7 mg/dL    Creatinine 1.41 (H) 0.73 - 1.18 mg/dL    BUN/Creatinine Ratio 32     Calcium Level Total 8.7 (L) 8.8 - 10.0 mg/dL    Anion Gap 10.0 mEq/L    eGFR 52 mls/min/1.73/m2   CBC with Differential    Collection Time: 03/10/23  5:15 AM   Result Value Ref Range    WBC 11.2 4.5 - 11.5 x10(3)/mcL    RBC 3.91 (L) 4.70 - 6.10 x10(6)/mcL    Hgb 12.2 (L) 14.0 - 18.0 g/dL    Hct 35.5 (L) 42.0 - 52.0 %    MCV 90.8 80.0 - 94.0 fL    MCH 31.2 pg    MCHC 34.4 33.0 - 36.0 g/dL    RDW 13.2 11.5 - 17.0 %    Platelet 253 130 - 400 x10(3)/mcL    MPV 10.8 (H) 7.4 - 10.4 fL    Neut % 73.6 %    Lymph % 13.0 %    Mono % 11.3 %    Eos % 1.3 %    Basophil % 0.2 %    Lymph # 1.45 0.6 - 4.6 x10(3)/mcL    Neut # 8.21 2.1 - 9.2 x10(3)/mcL    Mono # 1.26 0.1 - 1.3 x10(3)/mcL    Eos # 0.14 0 - 0.9 x10(3)/mcL    Baso # 0.02 0 - 0.2 x10(3)/mcL    IG# 0.07 (H) 0 - 0.04 x10(3)/mcL    IG% 0.6 %    NRBC% 0.0 %   Lipid Panel    Collection Time: 03/10/23  5:15 AM   Result Value Ref Range    Cholesterol Total 167 <=200 mg/dL    HDL Cholesterol 50 35 - 60 mg/dL    Triglyceride 125 34 - 140 mg/dL    Cholesterol/HDL Ratio 3 0 - 5    Very Low Density Lipoprotein 25     LDL Cholesterol 92.00 50.00 - 140.00 mg/dL   Hemoglobin A1C    Collection Time: 03/10/23  5:15 AM   Result Value Ref Range    Hemoglobin A1c 5.2 <=7.0 %    Estimated Average Glucose 102.5 mg/dL   POCT Glucose, Hand-Held Device    Collection Time: 03/10/23  8:23 PM   Result Value Ref Range    POC Glucose 107 70 - 110 MG/DL   POCT glucose    Collection Time: 03/10/23  8:23 PM   Result Value Ref Range    POCT Glucose 107 70 - 110 mg/dL   POCT glucose    Collection Time: 03/11/23  5:57 AM   Result Value Ref Range    POCT Glucose 89 70 - 110 mg/dL   Basic Metabolic Panel    Collection Time: 03/11/23  6:28 AM   Result Value Ref Range    Sodium  Level 135 (L) 136 - 145 mmol/L    Potassium Level 4.2 3.5 - 5.1 mmol/L    Chloride 105 98 - 107 mmol/L    Carbon Dioxide 21 (L) 23 - 31 mmol/L    Glucose Level 93 82 - 115 mg/dL    Blood Urea Nitrogen 31.1 (H) 8.4 - 25.7 mg/dL    Creatinine 1.16 0.73 - 1.18 mg/dL    BUN/Creatinine Ratio 27     Calcium Level Total 9.3 8.8 - 10.0 mg/dL    Anion Gap 9.0 mEq/L    eGFR >60 mls/min/1.73/m2   CBC with Differential    Collection Time: 03/11/23  6:28 AM   Result Value Ref Range    WBC 7.9 4.5 - 11.5 x10(3)/mcL    RBC 3.86 (L) 4.70 - 6.10 x10(6)/mcL    Hgb 12.0 (L) 14.0 - 18.0 g/dL    Hct 35.1 (L) 42.0 - 52.0 %    MCV 90.9 80.0 - 94.0 fL    MCH 31.1 pg    MCHC 34.2 33.0 - 36.0 g/dL    RDW 13.1 11.5 - 17.0 %    Platelet 291 130 - 400 x10(3)/mcL    MPV 10.7 (H) 7.4 - 10.4 fL    Neut % 58.4 %    Lymph % 23.4 %    Mono % 14.1 %    Eos % 3.3 %    Basophil % 0.4 %    Lymph # 1.84 0.6 - 4.6 x10(3)/mcL    Neut # 4.61 2.1 - 9.2 x10(3)/mcL    Mono # 1.11 0.1 - 1.3 x10(3)/mcL    Eos # 0.26 0 - 0.9 x10(3)/mcL    Baso # 0.03 0 - 0.2 x10(3)/mcL    IG# 0.03 0 - 0.04 x10(3)/mcL    IG% 0.4 %    NRBC% 0.0 %   FECAL LEUKOCYTES - LACTOFERRIN ON  STOOL    Collection Time: 03/11/23  7:15 AM   Result Value Ref Range    Fecal Leukocyte Negative Negative    Leukocyte Positive Control Positive Positive    Leukocyte Negative Control Negative Negative   Stool Culture    Collection Time: 03/11/23  7:15 AM    Specimen: Stool   Result Value Ref Range    Stool Culture       Negative for Salmonella, Shigella, Campylobacter, Vibrio, Aeromonas, Pleisiomonas,Yersinia, or Shiga Toxin 1 and 2.   POCT glucose    Collection Time: 03/11/23  8:27 PM   Result Value Ref Range    POCT Glucose 92 70 - 110 mg/dL   POCT glucose    Collection Time: 03/12/23  4:31 AM   Result Value Ref Range    POCT Glucose 95 70 - 110 mg/dL   Basic metabolic panel    Collection Time: 03/12/23  6:30 AM   Result Value Ref Range    Sodium Level 136 136 - 145 mmol/L    Potassium Level 4.0 3.5 -  5.1 mmol/L    Chloride 104 98 - 107 mmol/L    Carbon Dioxide 23 23 - 31 mmol/L    Glucose Level 94 82 - 115 mg/dL    Blood Urea Nitrogen 24.0 8.4 - 25.7 mg/dL    Creatinine 1.20 (H) 0.73 - 1.18 mg/dL    BUN/Creatinine Ratio 20     Calcium Level Total 9.3 8.8 - 10.0 mg/dL    Anion Gap 9.0 mEq/L    eGFR >60 mls/min/1.73/m2   CBC with Differential    Collection Time: 03/12/23  7:15 AM   Result Value Ref Range    WBC 7.9 4.5 - 11.5 x10(3)/mcL    RBC 3.79 (L) 4.70 - 6.10 x10(6)/mcL    Hgb 11.8 (L) 14.0 - 18.0 g/dL    Hct 34.3 (L) 42.0 - 52.0 %    MCV 90.5 80.0 - 94.0 fL    MCH 31.1 pg    MCHC 34.4 33.0 - 36.0 g/dL    RDW 12.9 11.5 - 17.0 %    Platelet 265 130 - 400 x10(3)/mcL    MPV 10.1 7.4 - 10.4 fL    Neut % 55.6 %    Lymph % 27.7 %    Mono % 12.5 %    Eos % 3.0 %    Basophil % 0.4 %    Lymph # 2.18 0.6 - 4.6 x10(3)/mcL    Neut # 4.38 2.1 - 9.2 x10(3)/mcL    Mono # 0.98 0.1 - 1.3 x10(3)/mcL    Eos # 0.24 0 - 0.9 x10(3)/mcL    Baso # 0.03 0 - 0.2 x10(3)/mcL    IG# 0.06 (H) 0 - 0.04 x10(3)/mcL    IG% 0.8 %    NRBC% 0.0 %   PTT Heparin Monitoring    Collection Time: 03/12/23  9:04 AM   Result Value Ref Range    PTT Heparin Monitor 25.2 23.2 - 33.7 seconds       Significant Imaging:  Imaging Results    None       Physical Exam  HENT:      Mouth/Throat:      Mouth: Mucous membranes are moist.   Cardiovascular:      Rate and Rhythm: Bradycardia present. Rhythm irregular.      Pulses: Normal pulses.      Heart sounds: Normal heart sounds.   Pulmonary:      Effort: Pulmonary effort is normal.      Breath sounds: Normal breath sounds.   Abdominal:      Palpations: Abdomen is soft.   Musculoskeletal:         General: Normal range of motion.   Skin:     General: Skin is warm.      Capillary Refill: Capillary refill takes less than 2 seconds.   Neurological:      General: No focal deficit present.      Mental Status: He is alert.   Psychiatric:         Mood and Affect: Mood normal.       Home Medications:   No current  facility-administered medications on file prior to encounter.     Current Outpatient Medications on File Prior to Encounter   Medication Sig Dispense Refill    albuterol (PROVENTIL/VENTOLIN HFA) 90 mcg/actuation inhaler Inhale 1 puff into the lungs every 4 to 6 hours as needed.      amLODIPine (NORVASC) 10 MG tablet Take 1 tablet by mouth once daily.      aspirin 81 MG Chew Take 1 tablet by mouth once daily.      cyanocobalamin (VITAMIN B-12) 1000 MCG tablet Take 1,000 mcg by mouth once daily.      fenofibrate 160 MG Tab Take 1 tablet by mouth once daily.      fluticasone propionate (FLONASE) 50 mcg/actuation nasal spray 2 sprays by Each Nostril route once daily.      lisinopriL (PRINIVIL,ZESTRIL) 20 MG tablet Take 1 tablet by mouth once daily.      pantoprazole (PROTONIX) 40 MG tablet Take 40 mg by mouth once daily.      predniSONE (DELTASONE) 10 MG tablet Take 10 mg by mouth once daily.      tiZANidine (ZANAFLEX) 4 MG tablet Take 4 mg by mouth after meals as needed.      valACYclovir (VALTREX) 1000 MG tablet Take 1 tablet by mouth once daily.         Current Inpatient Medications:    Current Facility-Administered Medications:     0.9%  NaCl infusion, , Intravenous, Continuous, Tasia Bangura MD    acetaminophen tablet 650 mg, 650 mg, Oral, Q4H PRN, Luís Marie MD    aluminum-magnesium hydroxide-simethicone 200-200-20 mg/5 mL suspension 30 mL, 30 mL, Oral, Q6H PRN, Tasia Bangura MD    aspirin EC tablet 81 mg, 81 mg, Oral, Daily, Luís Marie MD, 81 mg at 03/12/23 0849    atorvastatin tablet 40 mg, 40 mg, Oral, QHS, Luís Marie MD, 40 mg at 03/11/23 2028    cetirizine tablet 10 mg, 10 mg, Oral, Daily, Tasia Bangura MD, 10 mg at 03/11/23 1223    fentaNYL 50 mcg/mL injection, , , PRN, Jess Whitley MD, 50 mcg at 03/12/23 1109    hydrALAZINE injection 10 mg, 10 mg, Intravenous, Q4H PRN, Luís Marie MD    HYDROcodone-acetaminophen 5-325 mg per tablet 1 tablet, 1 tablet, Oral, Q4H PRN, Luís Marie MD     LIDOcaine HCL 10 mg/ml (1%) injection, , , PRN, Jess Whitley MD, 10 mL at 03/12/23 1109    losartan tablet 25 mg, 25 mg, Oral, Daily, ERICK Becerra, 25 mg at 03/12/23 0849    midazolam (VERSED) 1 mg/mL injection, , , PRN, Jess Whitley MD, 1 mg at 03/12/23 1108    morphine injection 2 mg, 2 mg, Intravenous, Q4H PRN, Luís Marie MD    ondansetron disintegrating tablet 8 mg, 8 mg, Oral, Q8H PRN, Luís Marie MD    pantoprazole EC tablet 40 mg, 40 mg, Oral, Daily, Jame Howell PA-C, 40 mg at 03/11/23 0930    simethicone chewable tablet 80 mg, 1 tablet, Oral, QID (PC + HS), Tasia Bangura MD, 80 mg at 03/11/23 2028    ticagrelor tablet 90 mg, 90 mg, Oral, BID, ERICK Becerra, 90 mg at 03/12/23 0849    tirofiban 12.5 mg in sodium chloride 0.9% 250 mL infusion, , , Continuous PRN, Luís Marie MD, Last Rate: 8 mL/hr at 03/09/23 1203, 0.075 mcg/kg/min at 03/09/23 1203         VTE Risk Mitigation (From admission, onward)      None            Assessment:   Inferior STEMI  Native CAD  --[facility] 3.9.23: OM 2 70-80% long tubular stenosis. % occlusion of proximal PLB. Ostial PDA 80% stenosis. midPDA 50% stenosis and ostial 40-50% stenosis.  AV dissociation  Leukocytosis  --resolved  Hyponatremia  --improved   ERNESTO  --resolved  Diarrhea    Plan:   Continue Brilinta 90 mg bid, aspirin 81 mg daily, and atorvastatin 80 mg daily  Hold Metoprolol succinate due to AV dissociation/wenkebach  Will plan for repeat angiogram today to address OM stenosis. NPO since MN. R/B/A discussed with patient and he is amenable to proceeding. Consent obtained and placed on chart.   Monitor renal function          ERICK Becerra  Cardiology  [facility] -   03/12/2023

## 2023-03-12 NOTE — PROGRESS NOTES
[facility]  Hospital Medicine Progress Note        Chief Complaint: Inpatient Follow-up for diarrhea and bloating     HPI:   Reggie Cao is a 77 y.o. male with a past medical history of essential hypertension, hyperlipidemia, CAD, and GERD admitted to [facility] on 3/9/2023 transferred from [facility] for inferior STEMI.  Upon arrival to [facility] patient was taken to the cath lab.  Left heart catheterization revealed 100% occlusion of proximal PLB.  RCA balloon stent was placed. Overnight patient had one documented temperature of a 100.1° F. Patient complains of abdominal bloating with decreased appetite for the past 5 days.  Patient reports he takes Protonix at home.  Patient states diarrhea and abdominal bloating began yesterday after eating.  Patient reports 5 total episodes of diarrhea.  Patient denies rectal bleeding and dark/tarry stools.  Patient also endorses intermittent nausea. Hospital medicine was consulted for diarrhea and bloating.     Interval Hx:   No further fever reported. Pt reports no further diarrhea. Stool is soft. NPO for repeat C today.     Objective/physical exam:    General: In no acute distress, afebrile  Chest: Clear to auscultation bilaterally  Heart: RRR, +S1, S2, no appreciable murmur  Abdomen: Soft today, non tender, Bowel sounds are active.   MSK: Warm, no lower extremity edema, no clubbing or cyanosis  Skin- multiple areas of ecchymoses leroy arms   Neurologic: Alert and oriented x4, Cranial nerve II-XII intact, Strength 5/5 in all 4 extremities      VITAL SIGNS: 24 HRS MIN & MAX LAST   Temp  Min: 98.1 °F (36.7 °C)  Max: 99 °F (37.2 °C) 99 °F (37.2 °C)   BP  Min: 111/66  Max: 139/76 139/76   Pulse  Min: 49  Max: 80  76   Resp  Min: 18  Max: 20 18   SpO2  Min: 94 %  Max: 98 % 98 %       Recent Labs   Lab 03/10/23  0515 03/11/23  0628 03/12/23  0715   WBC 11.2 7.9 7.9   RBC 3.91* 3.86* 3.79*   HGB 12.2* 12.0* 11.8*   HCT 35.5* 35.1* 34.3*   MCV  90.8 90.9 90.5   MCH 31.2 31.1 31.1   MCHC 34.4 34.2 34.4   RDW 13.2 13.1 12.9    291 265   MPV 10.8* 10.7* 10.1       Recent Labs   Lab 03/09/23  1015 03/10/23  0515 03/11/23  0628 03/12/23  0630   * 135* 135* 136   K 4.1 3.7 4.2 4.0   CO2 25 21* 21* 23   BUN 52.1* 45.8* 31.1* 24.0   CREATININE 2.51* 1.41* 1.16 1.20*   CALCIUM 10.3* 8.7* 9.3 9.3   ALBUMIN 3.4  --   --   --    ALKPHOS 40  --   --   --    ALT 32  --   --   --    AST 57*  --   --   --    BILITOT 0.8  --   --   --           Microbiology Results (last 7 days)       Procedure Component Value Units Date/Time    Stool Culture [[id number]]  (Normal) Collected: 03/11/23 0715    Order Status: Completed Specimen: Stool Updated: 03/12/23 0929     Stool Culture Negative for Salmonella, Shigella, Campylobacter, Vibrio, Aeromonas, Pleisiomonas,Yersinia, or Shiga Toxin 1 and 2.    C. Difficile By Rapid Pcr [[id number]] Collected: 03/11/23 0715    Order Status: Canceled Specimen: Stool Updated: 03/11/23 0805             See below for Radiology    Scheduled Med:   aspirin  81 mg Oral Daily    atorvastatin  40 mg Oral QHS    cetirizine  10 mg Oral Daily    losartan  25 mg Oral Daily    pantoprazole  40 mg Oral Daily    simethicone  1 tablet Oral QID (PC + HS)    ticagrelor  90 mg Oral BID        Continuous Infusions:   sodium chloride 0.9% 100 mL/hr at 03/12/23 0815    sodium chloride 0.9% 125 mL/hr at 03/12/23 1301    tirofiban-0.9% sodium chloride 12.5 mg/250ml 0.075 mcg/kg/min (03/09/23 1203)        PRN Meds:  acetaminophen, aluminum-magnesium hydroxide-simethicone, hydrALAZINE, HYDROcodone-acetaminophen, morphine, ondansetron, tirofiban-0.9% sodium chloride 12.5 mg/250ml       Assessment/Plan:  Inferior STEMI  H/O CAD s/p PCI  AV dissociation   Essential HTN  ERNESTO- improving   Hyperlipidemia   Abdominal bloating - resolving   GERD  Leukocytosis - resolved   Acute diarrhea - improved      Plan-   NPO for repeat LHC today   No further fever reported.   Add  Simethicone for abd bloating   Continue PPI  No further diarrhea reported . Stool for C.diff test discontinued   Abdominal distension improved  Labs revealed WBC 7.9, Hgb 12, Na 135, K 4.2, CO2 21, Cr 1.1     BB held per Cards due to AV dissociation   Continue ASA, Brilinta, high intensity statin.  Plan for repeat LHC in am to address OM stenosis.           VTE prophylaxis: SCD    Patient condition:  Fair     Anticipated discharge and Disposition:         All diagnosis and differential diagnosis have been reviewed; assessment and plan has been documented; I have personally reviewed the labs and test results that are presently available; I have reviewed the patients medication list; I have reviewed the consulting providers response and recommendations. I have reviewed or attempted to review medical records based upon their availability    All of the patient's questions have been  addressed and answered. Patient's is agreeable to the above stated plan. I will continue to monitor closely and make adjustments to medical management as needed.  _____________________________________________________________________    Nutrition Status:    Radiology:  Cardiac catheterization    The 1st Mrg lesion was 90% stenosed with 0% stenosis post-intervention.    A STENT FRONTIER CRISTIANO [id number] stent was successfully placed.    A STENT FRONTIER CRISTIANO [id number] stent was successfully placed at 12 DELPHINE   for 10 sec.    A STENT FRONTIER CRISTIANO [id number] stent was successfully placed.    Right femoral artery angiogram and successful Angio-Seal placement    After obtaining informed consent patient was brought to the cath lab in   fasting state.  Right groin area was prepped and draped usual sterile   manner using ChloraPrep solution.  Skin was infiltrated with 2% lidocaine.    Six French sheath was placed in the right femoral artery   ultrasound-guided access.  Six French EBU 3.5 guide catheter used for the   intervention.  Selective left  coronary artery angiogram was performed   which shows severe disease in the OM 90%.  0.014 run-through hyper coat   wire was placed in the distal OM crossing the lesion without much   difficulty.  The lesion was pre-dilated by using 2/20 mm and 2.5/20 mm   balloon.  Post angioplasty lesion was stented with a 3/26 mm stent and   3.5/15 mm stent.  Overlapping fashion.  There is some edge dissection at   the distal end of the previous stents which was stented with a 3/8 mm   stent.  Post stenting angiogram shows lesion reduced from 90% to 0%.  No   complications noted.  Patient tolerated the procedure very well.  Right   femoral artery angiogram was performed.  Angio-Seal closure device was   applied achieving hemostasis.  Cardiac catheterization  Narrative:   The 1st RPL lesion was 100% stenosed with 0% stenosis   post-intervention.    A STENT SYNERGY XD 2.5X32MM stent was successfully placed at 11 DELPHINE for   10 sec.    The estimated blood loss was <50 mL.    This was a successful PCI for acute myocardial infarction.    Patient brought to catheterization lab in a fasting state.  Placed on   table and prepped in the usual sterile fashion.  Time-out was completed.    Right wrist was anesthetized with 1% lidocaine.  Via the Seldinger   technique and ultrasound guidance, a slender sheath was placed over wire   and flushed with a radial cocktail mix.  Tiger 4 catheter was then engaged   into left ventricular cavity hemodynamics were measured.  The gradient was   measured on pullback.  Then engaged to left main and RCA.  Stenosis was   noted in the circumflex however the RCA was completely occluded distally   obese.  JR4 guide was engaged into the RCA.  Runthrough distally.  5 mm   balloon angioplasty was performed which restored flow.  The flow was a   trickle paste.  Subsequent balloon angioplasty was performed along with   Dottering.  Sluggish flow remained due to poor outflow.  Intravascular   ultrasound was performed.   At that time balloon angioplasty resulted in a   distal RCA-possibly flow-limiting.  2.5 mm x 32 mm synergy stent was   deployed successfully.  Deployed at 2.5 mm.  3.5 mm x 8 mm noncompliant   balloon was then used to post dilate the entire distally up to 3.0 mm and   then proximally up to 3.6 mm.  Angiographically showed improved flow some   distal PLB flow.  A Kindra dual-lumen catheter was then placed which the   tip was placed into the proximal PLB branch.  Aggrastat was given prior to   this.  This was intracoronary.  Adenosine 75 mcg was given along with 100   mcg of intracoronary nitroglycerin the guide was disengaged slightly.    Angiogram was then performed which showed restoration of flow.  Distally   flow improved to ANIYA 2.  At that time all wires and catheters were   removed.  A TR band was placed over the access point to achieve   hemostasis.  A small hematoma did develop some secondary TR band was given   a loading dose of Brilinta and transported to the postop area in a stable   condition      Complications:  None   Contrast load: 150cc    Findings:   Left main:  Large caliber vessel which bifurcates into the circumflex and   LAD.  No significant disease noted.  Intravascular ultrasound showed 10%   stenosis circumferentially.  Left anterior descending:  Moderate caliber vessel with luminal   irregularities.  Small caliber diagonal branch D1 and D2 noted.  Circumflex:  Moderate caliber vessel.  True circumflex courses down the AV   groove which is trivial size.  Om 2 proximally has a 70-80% long tubular   stenosis.  RCA:  Large caliber dominant vessel with noted 100% occlusion of the   proximal PLB.  The PDA also has an ostial 80-lesion.  Of note,   intravascular ultrasound did show midvessel 50% stenosis, an ostial 40-50%   LVEDP:  13 mmHg  Aortic valve:  Stenosis    Intervention:  - RCA:  Proximal PLB-culprit vessel.  2.5 mm balloon angioplasty.  2.5 mm   x 32 mm synergy stent.  Post dilated with  a 3.5 mm x 8 mm, with IVUS   guidance, 3.0 distally and 3.61 mm proximally with serial increasing   diameters throughout.  Intracoronary, focal adenosine and nitroglycerin   given within the PLB branch. Restoration of ANIYA status from 0 to 3  Impression: Successful intervention of the 100% occluded proximal PLB   Residual 70-80% proximal OM2 (not intervened due to elevated renal   function and contrast up to 150 cc)  Normal Left Ventricular end-diastolic pressure   No aortic stenosis    Plan:   Routine postop care  Transfer to telemetry check echo   Check echocardiogram   Start aspirin, Brilinta, metoprolol and Lipitor.  EKG on floor arrival  Aggrastat drip for 12 hours      Tasia Bangura MD   03/12/2023

## 2023-03-13 LAB
ANION GAP SERPL CALC-SCNC: 9 MEQ/L
BASOPHILS # BLD AUTO: 0.03 X10(3)/MCL (ref 0–0.2)
BASOPHILS NFR BLD AUTO: 0.4 %
BUN SERPL-MCNC: 17.4 MG/DL (ref 8.4–25.7)
CALCIUM SERPL-MCNC: 8.7 MG/DL (ref 8.8–10)
CHLORIDE SERPL-SCNC: 105 MMOL/L (ref 98–107)
CO2 SERPL-SCNC: 21 MMOL/L (ref 23–31)
CREAT SERPL-MCNC: 1.2 MG/DL (ref 0.73–1.18)
CREAT/UREA NIT SERPL: 15
EOSINOPHIL # BLD AUTO: 0.24 X10(3)/MCL (ref 0–0.9)
EOSINOPHIL NFR BLD AUTO: 3 %
ERYTHROCYTE [DISTWIDTH] IN BLOOD BY AUTOMATED COUNT: 12.9 % (ref 11.5–17)
GFR SERPLBLD CREATININE-BSD FMLA CKD-EPI: >60 MLS/MIN/1.73/M2
GLUCOSE SERPL-MCNC: 99 MG/DL (ref 82–115)
HCT VFR BLD AUTO: 34.8 % (ref 42–52)
HGB BLD-MCNC: 11.7 G/DL (ref 14–18)
IMM GRANULOCYTES # BLD AUTO: 0.07 X10(3)/MCL (ref 0–0.04)
IMM GRANULOCYTES NFR BLD AUTO: 0.9 %
LYMPHOCYTES # BLD AUTO: 2.01 X10(3)/MCL (ref 0.6–4.6)
LYMPHOCYTES NFR BLD AUTO: 25 %
MCH RBC QN AUTO: 31.3 PG
MCHC RBC AUTO-ENTMCNC: 33.6 G/DL (ref 33–36)
MCV RBC AUTO: 93 FL (ref 80–94)
MONOCYTES # BLD AUTO: 1.04 X10(3)/MCL (ref 0.1–1.3)
MONOCYTES NFR BLD AUTO: 12.9 %
NEUTROPHILS # BLD AUTO: 4.66 X10(3)/MCL (ref 2.1–9.2)
NEUTROPHILS NFR BLD AUTO: 57.8 %
NRBC BLD AUTO-RTO: 0 %
PLATELET # BLD AUTO: 321 X10(3)/MCL (ref 130–400)
PMV BLD AUTO: 10.1 FL (ref 7.4–10.4)
POCT GLUCOSE: 102 MG/DL (ref 70–110)
POCT GLUCOSE: 117 MG/DL (ref 70–110)
POTASSIUM SERPL-SCNC: 4.4 MMOL/L (ref 3.5–5.1)
RBC # BLD AUTO: 3.74 X10(6)/MCL (ref 4.7–6.1)
SODIUM SERPL-SCNC: 135 MMOL/L (ref 136–145)
WBC # SPEC AUTO: 8.1 X10(3)/MCL (ref 4.5–11.5)

## 2023-03-13 PROCEDURE — 21400001 HC TELEMETRY ROOM

## 2023-03-13 PROCEDURE — 25000003 PHARM REV CODE 250: Performed by: NURSE PRACTITIONER

## 2023-03-13 PROCEDURE — 80048 BASIC METABOLIC PNL TOTAL CA: CPT | Performed by: NURSE PRACTITIONER

## 2023-03-13 PROCEDURE — 25000003 PHARM REV CODE 250: Performed by: INTERNAL MEDICINE

## 2023-03-13 PROCEDURE — 93010 ELECTROCARDIOGRAM REPORT: CPT | Mod: ,,, | Performed by: INTERNAL MEDICINE

## 2023-03-13 PROCEDURE — 25000003 PHARM REV CODE 250: Performed by: PHYSICIAN ASSISTANT

## 2023-03-13 PROCEDURE — 93010 EKG 12-LEAD: ICD-10-PCS | Mod: ,,, | Performed by: INTERNAL MEDICINE

## 2023-03-13 PROCEDURE — 63600175 PHARM REV CODE 636 W HCPCS: Performed by: INTERNAL MEDICINE

## 2023-03-13 PROCEDURE — 93005 ELECTROCARDIOGRAM TRACING: CPT

## 2023-03-13 PROCEDURE — 85025 COMPLETE CBC W/AUTO DIFF WBC: CPT | Performed by: NURSE PRACTITIONER

## 2023-03-13 RX ORDER — ENOXAPARIN SODIUM 100 MG/ML
40 INJECTION SUBCUTANEOUS EVERY 24 HOURS
Status: DISCONTINUED | OUTPATIENT
Start: 2023-03-13 | End: 2023-03-14 | Stop reason: HOSPADM

## 2023-03-13 RX ADMIN — METOPROLOL SUCCINATE 12.5 MG: 25 TABLET, EXTENDED RELEASE ORAL at 11:03

## 2023-03-13 RX ADMIN — SIMETHICONE 80 MG: 80 TABLET, CHEWABLE ORAL at 09:03

## 2023-03-13 RX ADMIN — TICAGRELOR 90 MG: 90 TABLET ORAL at 09:03

## 2023-03-13 RX ADMIN — ENOXAPARIN SODIUM 40 MG: 40 INJECTION SUBCUTANEOUS at 05:03

## 2023-03-13 RX ADMIN — ASPIRIN 81 MG: 81 TABLET, COATED ORAL at 09:03

## 2023-03-13 RX ADMIN — PANTOPRAZOLE SODIUM 40 MG: 40 TABLET, DELAYED RELEASE ORAL at 09:03

## 2023-03-13 RX ADMIN — SIMETHICONE 80 MG: 80 TABLET, CHEWABLE ORAL at 03:03

## 2023-03-13 RX ADMIN — ATORVASTATIN CALCIUM 40 MG: 40 TABLET, FILM COATED ORAL at 08:03

## 2023-03-13 RX ADMIN — SIMETHICONE 80 MG: 80 TABLET, CHEWABLE ORAL at 08:03

## 2023-03-13 RX ADMIN — TICAGRELOR 90 MG: 90 TABLET ORAL at 08:03

## 2023-03-13 RX ADMIN — LOSARTAN POTASSIUM 25 MG: 25 TABLET, FILM COATED ORAL at 09:03

## 2023-03-13 RX ADMIN — CETIRIZINE HYDROCHLORIDE 10 MG: 10 TABLET, FILM COATED ORAL at 09:03

## 2023-03-13 NOTE — NURSING
Nurses Note -- 4 Eyes      3/13/2023   4:03 PM      Skin assessed during: Admit      [x] No Pressure Injuries Present    []Prevention Measures Documented      [] Yes- Altered Skin Integrity Present or Discovered   [] LDA Added if Not in Epic (Describe Wound)   [] New Altered Skin Integrity was Present on Admit and Documented in LDA   [] Wound Image Taken    Wound Care Consulted? No    Attending Nurse:  Trice West LPN     Second RN/Staff Member: Peyton Rueda RN

## 2023-03-13 NOTE — PROGRESS NOTES
[facility] - Cath Lab Services  Cardiology  Progress Note    Patient Name: Reggie Cao  MRN: [id number]  Admission Date: 3/9/2023  Hospital Length of Stay: 4 days  Code Status: No Order   Attending Provider: Luís Marie MD   Consulting Provider: ERICK Becerra  Primary Care Physician: Tong Escobar MD  Principal Problem:<principal problem not specified>    Patient information was obtained from patient, past medical records, and ER records.     Subjective:     Chief Complaint:  chest pain    HPI:   Mr. Cao is a 76 y/o male,unknown to [facility], who presented to outlying facility due to c/o chest pain worsened with eating. Patient reports esophageal spasms x 1 week with difficulty getting liquids/solids down. EKG obtained revealed inferior STEMI; therefore he was transferred to [facility] for cath lab services. He was brought immediately to cath lab and underwent LHC revealing 100% RPL which was reduced to 0% post PTA/Stent with residual  Patient has been admitted to [facility] for STEMI    Hospital Course:   03.10.23: Patient awake in bed. NAD. VSS. Denies Cp/SOB. Renal indices improving.   3.11.23: EKG revealing AV dissociation. VSS. Renal function has normalized. Dr Whitley reviewed cath- PDA is jailed by stent and is small vessel- unable to treat stenosis. OM is amenable to stenting  3.12.23: Awake in bed. NAD. Noted Wenckebach on tele with HR   3.13.23: Patient s/p LHC yesterday with PCI to OM2. Right radial site benign. Extensive bruising noted to right forearm. No hematoma noted. Having episodes of 2:1 AVB overnight (0100 and 0300). Currently SR with long 1st degree AVB.     PMH: HTN  PSH: none  Family History: unknown  Social History: nonsmoker    Previous Cardiac Diagnostics:   [facility] [place] 03.12.23:  OM1 lesion 90% stenosis reduced to 0% stenosis post PTA/SEBASTIEN x 3.    [facility] [place] 03.09.23:  Left main:  Large caliber vessel which bifurcates into the circumflex and LAD.  No significant disease noted.  Intravascular  ultrasound showed 10% stenosis circumferentially.  Left anterior descending:  Moderate caliber vessel with luminal irregularities.  Small caliber diagonal branch D1 and D2 noted.  Circumflex:  Moderate caliber vessel.  True circumflex courses down the AV groove which is trivial size.  Om 2 proximally has a 70-80% long tubular stenosis.  RCA:  Large caliber dominant vessel with noted 100% occlusion of the proximal PLB.  The PDA also has an ostial 80-lesion.  Of note, intravascular ultrasound did show midvessel 50% stenosis, an ostial 40-50%   LVEDP:  13 mmHg  Aortic valve:  Stenosis     Intervention:  - RCA:  Proximal PLB-culprit vessel.  2.5 mm balloon angioplasty.  2.5 mm x 32 mm synergy stent.  Post dilated with a 3.5 mm x 8 mm, with IVUS guidance, 3.0 distally and 3.61 mm proximally with serial increasing diameters throughout.  Intracoronary, focal adenosine and nitroglycerin given within the PLB branch. Restoration of ANIYA status from 0 to 3     Impression:  Successful intervention of the 100% occluded proximal PLB   Residual 70-80% proximal OM2 (not intervened due to elevated renal function and contrast up to 150 cc)  Normal Left Ventricular end-diastolic pressure   No aortic stenosis    TTE 03.09.23:  The left ventricle is normal in size with concentric remodeling and low normal systolic function. LVEF 50-55% with inferior wall hypokinesis. Normal left ventricular diastolic function. Normal right ventricular size with normal right ventricular systolic function. Mild mitral regurgitation.       Review of Systems   Constitutional: Negative.    Respiratory: Negative.     Cardiovascular: Negative.    Genitourinary: Negative.    Musculoskeletal: Negative.    Skin: Negative.    Neurological: Negative.    Psychiatric/Behavioral: Negative.       Objective:     Vital Signs (Most Recent):  Temp: 99.2 °F (37.3 °C) (03/13/23 0711)  Pulse: 73 (03/13/23 0711)  Resp: 18 (03/13/23 0711)  BP: 115/67 (03/13/23 0711)  SpO2: (!)  92 % (03/13/23 0711) Vital Signs (24h Range):  Temp:  [98.2 °F (36.8 °C)-99.2 °F (37.3 °C)] 99.2 °F (37.3 °C)  Pulse:  [73-87] 73  Resp:  [17-18] 18  SpO2:  [92 %-98 %] 92 %  BP: (108-139)/(65-76) 115/67     Weight: 88.9 kg (195 lb 15.8 oz)  Body mass index is 26.84 kg/m².    SpO2: (!) 92 %         Intake/Output Summary (Last 24 hours) at 3/13/2023 0935  Last data filed at 3/12/2023 1458  Gross per 24 hour   Intake 420 ml   Output --   Net 420 ml         Lines/Drains/Airways       Peripheral Intravenous Line  Duration                  Peripheral IV - Single Lumen 03/09/23 1900 Anterior;Distal;Left Upper Arm 3 days         Peripheral IV - Single Lumen 03/09/23 1900 Left;Posterior Hand 3 days                    Significant Labs:  Recent Results (from the past 72 hour(s))   POCT Glucose, Hand-Held Device    Collection Time: 03/10/23  8:23 PM   Result Value Ref Range    POC Glucose 107 70 - 110 MG/DL   POCT glucose    Collection Time: 03/10/23  8:23 PM   Result Value Ref Range    POCT Glucose 107 70 - 110 mg/dL   POCT glucose    Collection Time: 03/11/23  5:57 AM   Result Value Ref Range    POCT Glucose 89 70 - 110 mg/dL   Basic Metabolic Panel    Collection Time: 03/11/23  6:28 AM   Result Value Ref Range    Sodium Level 135 (L) 136 - 145 mmol/L    Potassium Level 4.2 3.5 - 5.1 mmol/L    Chloride 105 98 - 107 mmol/L    Carbon Dioxide 21 (L) 23 - 31 mmol/L    Glucose Level 93 82 - 115 mg/dL    Blood Urea Nitrogen 31.1 (H) 8.4 - 25.7 mg/dL    Creatinine 1.16 0.73 - 1.18 mg/dL    BUN/Creatinine Ratio 27     Calcium Level Total 9.3 8.8 - 10.0 mg/dL    Anion Gap 9.0 mEq/L    eGFR >60 mls/min/1.73/m2   CBC with Differential    Collection Time: 03/11/23  6:28 AM   Result Value Ref Range    WBC 7.9 4.5 - 11.5 x10(3)/mcL    RBC 3.86 (L) 4.70 - 6.10 x10(6)/mcL    Hgb 12.0 (L) 14.0 - 18.0 g/dL    Hct 35.1 (L) 42.0 - 52.0 %    MCV 90.9 80.0 - 94.0 fL    MCH 31.1 pg    MCHC 34.2 33.0 - 36.0 g/dL    RDW 13.1 11.5 - 17.0 %     Platelet 291 130 - 400 x10(3)/mcL    MPV 10.7 (H) 7.4 - 10.4 fL    Neut % 58.4 %    Lymph % 23.4 %    Mono % 14.1 %    Eos % 3.3 %    Basophil % 0.4 %    Lymph # 1.84 0.6 - 4.6 x10(3)/mcL    Neut # 4.61 2.1 - 9.2 x10(3)/mcL    Mono # 1.11 0.1 - 1.3 x10(3)/mcL    Eos # 0.26 0 - 0.9 x10(3)/mcL    Baso # 0.03 0 - 0.2 x10(3)/mcL    IG# 0.03 0 - 0.04 x10(3)/mcL    IG% 0.4 %    NRBC% 0.0 %   FECAL LEUKOCYTES - LACTOFERRIN ON  STOOL    Collection Time: 03/11/23  7:15 AM   Result Value Ref Range    Fecal Leukocyte Negative Negative    Leukocyte Positive Control Positive Positive    Leukocyte Negative Control Negative Negative   Stool Culture    Collection Time: 03/11/23  7:15 AM    Specimen: Stool   Result Value Ref Range    Stool Culture       Negative for Salmonella, Shigella, Campylobacter, Vibrio, Aeromonas, Pleisiomonas,Yersinia, or Shiga Toxin 1 and 2.   POCT glucose    Collection Time: 03/11/23  8:27 PM   Result Value Ref Range    POCT Glucose 92 70 - 110 mg/dL   POCT glucose    Collection Time: 03/12/23  4:31 AM   Result Value Ref Range    POCT Glucose 95 70 - 110 mg/dL   Basic metabolic panel    Collection Time: 03/12/23  6:30 AM   Result Value Ref Range    Sodium Level 136 136 - 145 mmol/L    Potassium Level 4.0 3.5 - 5.1 mmol/L    Chloride 104 98 - 107 mmol/L    Carbon Dioxide 23 23 - 31 mmol/L    Glucose Level 94 82 - 115 mg/dL    Blood Urea Nitrogen 24.0 8.4 - 25.7 mg/dL    Creatinine 1.20 (H) 0.73 - 1.18 mg/dL    BUN/Creatinine Ratio 20     Calcium Level Total 9.3 8.8 - 10.0 mg/dL    Anion Gap 9.0 mEq/L    eGFR >60 mls/min/1.73/m2   CBC with Differential    Collection Time: 03/12/23  7:15 AM   Result Value Ref Range    WBC 7.9 4.5 - 11.5 x10(3)/mcL    RBC 3.79 (L) 4.70 - 6.10 x10(6)/mcL    Hgb 11.8 (L) 14.0 - 18.0 g/dL    Hct 34.3 (L) 42.0 - 52.0 %    MCV 90.5 80.0 - 94.0 fL    MCH 31.1 pg    MCHC 34.4 33.0 - 36.0 g/dL    RDW 12.9 11.5 - 17.0 %    Platelet 265 130 - 400 x10(3)/mcL    MPV 10.1 7.4 - 10.4 fL     Neut % 55.6 %    Lymph % 27.7 %    Mono % 12.5 %    Eos % 3.0 %    Basophil % 0.4 %    Lymph # 2.18 0.6 - 4.6 x10(3)/mcL    Neut # 4.38 2.1 - 9.2 x10(3)/mcL    Mono # 0.98 0.1 - 1.3 x10(3)/mcL    Eos # 0.24 0 - 0.9 x10(3)/mcL    Baso # 0.03 0 - 0.2 x10(3)/mcL    IG# 0.06 (H) 0 - 0.04 x10(3)/mcL    IG% 0.8 %    NRBC% 0.0 %   PTT Heparin Monitoring    Collection Time: 03/12/23  9:04 AM   Result Value Ref Range    PTT Heparin Monitor 25.2 23.2 - 33.7 seconds   POCT glucose    Collection Time: 03/12/23  9:42 PM   Result Value Ref Range    POCT Glucose 95 70 - 110 mg/dL   POCT glucose    Collection Time: 03/13/23  5:25 AM   Result Value Ref Range    POCT Glucose 102 70 - 110 mg/dL   Basic Metabolic Panel    Collection Time: 03/13/23  5:58 AM   Result Value Ref Range    Sodium Level 135 (L) 136 - 145 mmol/L    Potassium Level 4.4 3.5 - 5.1 mmol/L    Chloride 105 98 - 107 mmol/L    Carbon Dioxide 21 (L) 23 - 31 mmol/L    Glucose Level 99 82 - 115 mg/dL    Blood Urea Nitrogen 17.4 8.4 - 25.7 mg/dL    Creatinine 1.20 (H) 0.73 - 1.18 mg/dL    BUN/Creatinine Ratio 15     Calcium Level Total 8.7 (L) 8.8 - 10.0 mg/dL    Anion Gap 9.0 mEq/L    eGFR >60 mls/min/1.73/m2   CBC with Differential    Collection Time: 03/13/23  5:58 AM   Result Value Ref Range    WBC 8.1 4.5 - 11.5 x10(3)/mcL    RBC 3.74 (L) 4.70 - 6.10 x10(6)/mcL    Hgb 11.7 (L) 14.0 - 18.0 g/dL    Hct 34.8 (L) 42.0 - 52.0 %    MCV 93.0 80.0 - 94.0 fL    MCH 31.3 pg    MCHC 33.6 33.0 - 36.0 g/dL    RDW 12.9 11.5 - 17.0 %    Platelet 321 130 - 400 x10(3)/mcL    MPV 10.1 7.4 - 10.4 fL    Neut % 57.8 %    Lymph % 25.0 %    Mono % 12.9 %    Eos % 3.0 %    Basophil % 0.4 %    Lymph # 2.01 0.6 - 4.6 x10(3)/mcL    Neut # 4.66 2.1 - 9.2 x10(3)/mcL    Mono # 1.04 0.1 - 1.3 x10(3)/mcL    Eos # 0.24 0 - 0.9 x10(3)/mcL    Baso # 0.03 0 - 0.2 x10(3)/mcL    IG# 0.07 (H) 0 - 0.04 x10(3)/mcL    IG% 0.9 %    NRBC% 0.0 %       Significant Imaging:  Imaging Results    None       Physical  Exam  HENT:      Mouth/Throat:      Mouth: Mucous membranes are moist.   Cardiovascular:      Rate and Rhythm: Bradycardia present. Rhythm irregular.      Pulses: Normal pulses.      Heart sounds: Normal heart sounds.      Comments: Episodes of 2:1 AVB  Pulmonary:      Effort: Pulmonary effort is normal.      Breath sounds: Normal breath sounds.   Abdominal:      Palpations: Abdomen is soft.   Musculoskeletal:         General: Normal range of motion.   Skin:     General: Skin is warm.      Capillary Refill: Capillary refill takes less than 2 seconds.      Comments: Right forearm noted with extensive ecchymosis. No hematoma to radial site.    Neurological:      General: No focal deficit present.      Mental Status: He is alert.   Psychiatric:         Mood and Affect: Mood normal.     Current Inpatient Medications:    Current Facility-Administered Medications:     acetaminophen tablet 650 mg, 650 mg, Oral, Q4H PRN, Luís Marie MD    aluminum-magnesium hydroxide-simethicone 200-200-20 mg/5 mL suspension 30 mL, 30 mL, Oral, Q6H PRN, Tasia Bangura MD    aspirin EC tablet 81 mg, 81 mg, Oral, Daily, Luís Marie MD, 81 mg at 03/13/23 0919    atorvastatin tablet 40 mg, 40 mg, Oral, QHS, Luís Marie MD, 40 mg at 03/12/23 2141    cetirizine tablet 10 mg, 10 mg, Oral, Daily, Tasia Bangura MD, 10 mg at 03/13/23 0918    hydrALAZINE injection 10 mg, 10 mg, Intravenous, Q4H PRN, Luís Marei MD    HYDROcodone-acetaminophen 5-325 mg per tablet 1 tablet, 1 tablet, Oral, Q4H PRN, Luís Marie MD    losartan tablet 25 mg, 25 mg, Oral, Daily, ERICK Becerra, 25 mg at 03/13/23 0918    morphine injection 2 mg, 2 mg, Intravenous, Q4H PRN, Luís Marie MD    ondansetron disintegrating tablet 8 mg, 8 mg, Oral, Q8H PRN, Luís Marie MD    pantoprazole EC tablet 40 mg, 40 mg, Oral, Daily, Jame Howell PA-C, 40 mg at 03/13/23 0918    simethicone chewable tablet 80 mg, 1 tablet, Oral, QID (PC + HS), Tasia Bangura MD, 80 mg  at 03/13/23 0919    ticagrelor tablet 90 mg, 90 mg, Oral, BID, ERICK Becerra, 90 mg at 03/13/23 0918    tirofiban 12.5 mg in sodium chloride 0.9% 250 mL infusion, , , Continuous PRN, Luís Marie MD, Last Rate: 8 mL/hr at 03/09/23 1203, 0.075 mcg/kg/min at 03/09/23 1203         VTE Risk Mitigation (From admission, onward)      None            Assessment:   Inferior STEMI  Native CAD  --[facility] 3.9.23: OM 2 70-80% long tubular stenosis. % occlusion of proximal PLB. Ostial PDA 80% stenosis. midPDA 50% stenosis and ostial 40-50% stenosis.  --[facility] 3.12.23: OM 1 90% stenosis post PTCA/SEBASTIEN x 3. Had edge dissection at distal edge of stent which was stented in overlapping fashion  AV dissociation  Intermittent episodes of 2:1 AVB  1st degree AVB    Plan:   Continue Brilinta 90 mg bid, aspirin 81 mg daily, and atorvastatin 80 mg daily  Still having episodes of high grade AVB. Will monitor rhythm and have EP evaluate patient  Will keep another 24hr to monitor rhythm  Will try Toprol XL 12.5 mg daily    IWinston MD,performed the substantive portion of this visit. I had a face-to-face time with the patient on 3/13/23. I reviewed and agree with the nurse practitioner's history, physical exam and plan of care.       Physical exam:    CV: RRR  Resp: CTA B  Extremities: No C/C     Medical decision making:   Will try low dose BB to see if AV conduction will tolerate it.    ERICK Becerra  Cardiology  [facility] -   03/13/2023

## 2023-03-13 NOTE — PROGRESS NOTES
[facility]  Hospital Medicine Progress Note        Chief Complaint: Inpatient Follow-up for  diarrhea and bloating     HPI:   Reggie Cao is a 77 y.o. male with a past medical history of essential hypertension, hyperlipidemia, CAD, and GERD admitted to [facility] on 3/9/2023 transferred from [facility] for inferior STEMI.  Upon arrival to [facility] patient was taken to the cath lab.  Left heart catheterization revealed 100% occlusion of proximal PLB.  RCA balloon stent was placed. Overnight patient had one documented temperature of a 100.1° F. Patient complains of abdominal bloating with decreased appetite for the past 5 days.  Patient reports he takes Protonix at home.  Patient states diarrhea and abdominal bloating began yesterday after eating.  Patient reports 5 total episodes of diarrhea.  Patient denies rectal bleeding and dark/tarry stools.  Patient also endorses intermittent nausea. Hospital medicine was consulted for diarrhea and bloating.      Interval Hx:   S/P repeat LHC yesterday with PCI/ stenting with SEBASTIEN x 3 to OM2. Episode of high grade 2:1 AV block overnight. Cards suggested additional 24h rhythm monitoring and EP evaluation with BB on board. Pt started back on Toprol XL 12.5 mg daily . No further fever or diarrhea.     Objective/physical exam:  General: In no acute distress, afebrile  Chest: Clear to auscultation bilaterally  Heart: RRR, +S1, S2, no appreciable murmur  Abdomen: Soft, nontender, BS +  MSK: Warm, no lower extremity edema, no clubbing or cyanosis  Neurologic: Alert and oriented x4, Cranial nerve II-XII intact, Strength 5/5 in all 4 extremities    VITAL SIGNS: 24 HRS MIN & MAX LAST   Temp  Min: 98 °F (36.7 °C)  Max: 99.2 °F (37.3 °C) 98.5 °F (36.9 °C)   BP  Min: 115/67  Max: 148/84 121/70   Pulse  Min: 71  Max: 84  71   Resp  Min: 17  Max: 18 18   SpO2  Min: 92 %  Max: 100 % 100 %       Recent Labs   Lab 03/11/23  0628 03/12/23  0715 03/13/23  0558    WBC 7.9 7.9 8.1   RBC 3.86* 3.79* 3.74*   HGB 12.0* 11.8* 11.7*   HCT 35.1* 34.3* 34.8*   MCV 90.9 90.5 93.0   MCH 31.1 31.1 31.3   MCHC 34.2 34.4 33.6   RDW 13.1 12.9 12.9    265 321   MPV 10.7* 10.1 10.1       Recent Labs   Lab 03/09/23  1015 03/10/23  0515 03/11/23  0628 03/12/23  0630 03/13/23  0558   *   < > 135* 136 135*   K 4.1   < > 4.2 4.0 4.4   CO2 25   < > 21* 23 21*   BUN 52.1*   < > 31.1* 24.0 17.4   CREATININE 2.51*   < > 1.16 1.20* 1.20*   CALCIUM 10.3*   < > 9.3 9.3 8.7*   ALBUMIN 3.4  --   --   --   --    ALKPHOS 40  --   --   --   --    ALT 32  --   --   --   --    AST 57*  --   --   --   --    BILITOT 0.8  --   --   --   --     < > = values in this interval not displayed.          Microbiology Results (last 7 days)       Procedure Component Value Units Date/Time    Stool Culture [[id number]]  (Normal) Collected: 03/11/23 0715    Order Status: Completed Specimen: Stool Updated: 03/13/23 1133     Stool Culture Negative for Salmonella, Shigella, Campylobacter, Vibrio, Aeromonas, Pleisiomonas,Yersinia, or Shiga Toxin 1 and 2.    C. Difficile By Rapid Pcr [[id number]] Collected: 03/11/23 0715    Order Status: Canceled Specimen: Stool Updated: 03/11/23 0805             See below for Radiology    Scheduled Med:   aspirin  81 mg Oral Daily    atorvastatin  40 mg Oral QHS    cetirizine  10 mg Oral Daily    losartan  25 mg Oral Daily    metoprolol succinate  12.5 mg Oral Daily    pantoprazole  40 mg Oral Daily    simethicone  1 tablet Oral QID (PC + HS)    ticagrelor  90 mg Oral BID        Continuous Infusions:   tirofiban-0.9% sodium chloride 12.5 mg/250ml 0.075 mcg/kg/min (03/09/23 1203)        PRN Meds:  acetaminophen, aluminum-magnesium hydroxide-simethicone, hydrALAZINE, HYDROcodone-acetaminophen, morphine, ondansetron, tirofiban-0.9% sodium chloride 12.5 mg/250ml       Assessment/Plan:  Inferior STEMI  H/O CAD s/p PCI  AV dissociation   Essential HTN  ERNESTO- improved and stable    Hyperlipidemia   Abdominal bloating - resolving   GERD  Leukocytosis - resolved   Acute diarrhea - improved      Plan-   Repeat LHC with PCI/Stenting of OM 2 with SEBASTIEN x3   Cards suggested additional 24h rhythm monitoring and EP evaluation with BB on board. Pt started back on Toprol XL 12.5 mg daily .   No further fever or diarrhea.   Cont Simethicone for abd bloating   Continue PPI  Abdominal distension improved        VTE prophylaxis: Lovenox     Patient condition:  Fair.     Anticipated discharge and Disposition:     Home with family     All diagnosis and differential diagnosis have been reviewed; assessment and plan has been documented; I have personally reviewed the labs and test results that are presently available; I have reviewed the patients medication list; I have reviewed the consulting providers response and recommendations. I have reviewed or attempted to review medical records based upon their availability    All of the patient's questions have been  addressed and answered. Patient's is agreeable to the above stated plan. I will continue to monitor closely and make adjustments to medical management as needed.  _____________________________________________________________________    Nutrition Status:    Radiology:  Cardiac catheterization    The 1st Mrg lesion was 90% stenosed with 0% stenosis post-intervention.    A STENT FRONTIER CRISTIANO [id number] stent was successfully placed.    A STENT FRONTIER CRISTIANO [id number] stent was successfully placed at 12 DELPHINE   for 10 sec.    A STENT FRONTIER CRISTIANO [id number] stent was successfully placed.    Right femoral artery angiogram and successful Angio-Seal placement    After obtaining informed consent patient was brought to the cath lab in   fasting state.  Right groin area was prepped and draped usual sterile   manner using ChloraPrep solution.  Skin was infiltrated with 2% lidocaine.    Six French sheath was placed in the right femoral artery   ultrasound-guided  access.  Six [place] EBU 3.5 guide catheter used for the   intervention.  Selective left coronary artery angiogram was performed   which shows severe disease in the OM 90%.  0.014 run-through hyper coat   wire was placed in the distal OM crossing the lesion without much   difficulty.  The lesion was pre-dilated by using 2/20 mm and 2.5/20 mm   balloon.  Post angioplasty lesion was stented with a 3/26 mm stent and   3.5/15 mm stent.  Overlapping fashion.  There is some edge dissection at   the distal end of the previous stents which was stented with a 3/8 mm   stent.  Post stenting angiogram shows lesion reduced from 90% to 0%.  No   complications noted.  Patient tolerated the procedure very well.  Right   femoral artery angiogram was performed.  Angio-Seal closure device was   applied achieving hemostasis.      Tasia Bangura MD   03/13/2023

## 2023-03-13 NOTE — PROGRESS NOTES
[facility] - Cath Lab Services  Cardiology  Progress Note    Patient Name: Reggie Cao  MRN: [id number]  Admission Date: 3/9/2023  Hospital Length of Stay: 4 days  Code Status: No Order   Attending Provider: Luís Marie MD   Consulting Provider: ERICK Becerra  Primary Care Physician: Tong Escobar MD  Principal Problem:<principal problem not specified>    Patient information was obtained from patient, past medical records, and ER records.     Subjective:     Chief Complaint:  chest pain    HPI:   Mr. Cao is a 76 y/o male,unknown to [facility], who presented to outlying facility due to c/o chest pain worsened with eating. Patient reports esophageal spasms x 1 week with difficulty getting liquids/solids down. EKG obtained revealed inferior STEMI; therefore he was transferred to [facility] for cath lab services. He was brought immediately to cath lab and underwent LHC revealing 100% RPL which was reduced to 0% post PTA/Stent with residual  Patient has been admitted to [facility] for STEMI    Hospital Course:   03.10.23: Patient awake in bed. NAD. VSS. Denies Cp/SOB. Renal indices improving.   3.11.23: EKG revealing AV dissociation. VSS. Renal function has normalized. Dr Whitley reviewed cath- PDA is jailed by stent and is small vessel- unable to treat stenosis. OM is amenable to stenting  3.12.23: Awake in bed. NAD. Noted Wenckebach on tele with HR   3.13.23: Patient s/p LHC yesterday with PCI to OM2. Right radial site benign. Extensive bruising noted to right forearm. No hematoma noted. Having episodes of 2:1 AVB overnight (0100 and 0300). Currently SR with long 1st degree AVB.     PMH: HTN  PSH: none  Family History: unknown  Social History: nonsmoker    Previous Cardiac Diagnostics:   [facility] 03.12.23:  OM1 lesion 90% stenosis reduced to 0% stenosis post PTA/SEBASTIEN x 3.    [facility] 03.09.23:  Left main:  Large caliber vessel which bifurcates into the circumflex and LAD.  No significant disease noted.  Intravascular  ultrasound showed 10% stenosis circumferentially.  Left anterior descending:  Moderate caliber vessel with luminal irregularities.  Small caliber diagonal branch D1 and D2 noted.  Circumflex:  Moderate caliber vessel.  True circumflex courses down the AV groove which is trivial size.  Om 2 proximally has a 70-80% long tubular stenosis.  RCA:  Large caliber dominant vessel with noted 100% occlusion of the proximal PLB.  The PDA also has an ostial 80-lesion.  Of note, intravascular ultrasound did show midvessel 50% stenosis, an ostial 40-50%   LVEDP:  13 mmHg  Aortic valve:  Stenosis     Intervention:  - RCA:  Proximal PLB-culprit vessel.  2.5 mm balloon angioplasty.  2.5 mm x 32 mm synergy stent.  Post dilated with a 3.5 mm x 8 mm, with IVUS guidance, 3.0 distally and 3.61 mm proximally with serial increasing diameters throughout.  Intracoronary, focal adenosine and nitroglycerin given within the PLB branch. Restoration of ANIYA status from 0 to 3     Impression:  Successful intervention of the 100% occluded proximal PLB   Residual 70-80% proximal OM2 (not intervened due to elevated renal function and contrast up to 150 cc)  Normal Left Ventricular end-diastolic pressure   No aortic stenosis    TTE 03.09.23:  The left ventricle is normal in size with concentric remodeling and low normal systolic function. LVEF 50-55% with inferior wall hypokinesis. Normal left ventricular diastolic function. Normal right ventricular size with normal right ventricular systolic function. Mild mitral regurgitation.       Review of Systems   Constitutional: Negative.    Respiratory: Negative.     Cardiovascular: Negative.    Genitourinary: Negative.    Musculoskeletal: Negative.    Skin: Negative.    Neurological: Negative.    Psychiatric/Behavioral: Negative.       Objective:     Vital Signs (Most Recent):  Temp: 98 °F (36.7 °C) (03/13/23 1107)  Pulse: 84 (03/13/23 1107)  Resp: 18 (03/13/23 1107)  BP: (!) 148/84 (03/13/23 1107)  SpO2: 95  % (03/13/23 1107) Vital Signs (24h Range):  Temp:  [98 °F (36.7 °C)-99.2 °F (37.3 °C)] 98 °F (36.7 °C)  Pulse:  [73-87] 84  Resp:  [17-18] 18  SpO2:  [92 %-98 %] 95 %  BP: (108-148)/(65-84) 148/84     Weight: 88.9 kg (195 lb 15.8 oz)  Body mass index is 26.84 kg/m².    SpO2: 95 %         Intake/Output Summary (Last 24 hours) at 3/13/2023 1134  Last data filed at 3/12/2023 1458  Gross per 24 hour   Intake 420 ml   Output --   Net 420 ml         Lines/Drains/Airways       Peripheral Intravenous Line  Duration                  Peripheral IV - Single Lumen 03/09/23 1900 Anterior;Distal;Left Upper Arm 3 days         Peripheral IV - Single Lumen 03/09/23 1900 Left;Posterior Hand 3 days                    Significant Labs:  Recent Results (from the past 72 hour(s))   POCT Glucose, Hand-Held Device    Collection Time: 03/10/23  8:23 PM   Result Value Ref Range    POC Glucose 107 70 - 110 MG/DL   POCT glucose    Collection Time: 03/10/23  8:23 PM   Result Value Ref Range    POCT Glucose 107 70 - 110 mg/dL   POCT glucose    Collection Time: 03/11/23  5:57 AM   Result Value Ref Range    POCT Glucose 89 70 - 110 mg/dL   Basic Metabolic Panel    Collection Time: 03/11/23  6:28 AM   Result Value Ref Range    Sodium Level 135 (L) 136 - 145 mmol/L    Potassium Level 4.2 3.5 - 5.1 mmol/L    Chloride 105 98 - 107 mmol/L    Carbon Dioxide 21 (L) 23 - 31 mmol/L    Glucose Level 93 82 - 115 mg/dL    Blood Urea Nitrogen 31.1 (H) 8.4 - 25.7 mg/dL    Creatinine 1.16 0.73 - 1.18 mg/dL    BUN/Creatinine Ratio 27     Calcium Level Total 9.3 8.8 - 10.0 mg/dL    Anion Gap 9.0 mEq/L    eGFR >60 mls/min/1.73/m2   CBC with Differential    Collection Time: 03/11/23  6:28 AM   Result Value Ref Range    WBC 7.9 4.5 - 11.5 x10(3)/mcL    RBC 3.86 (L) 4.70 - 6.10 x10(6)/mcL    Hgb 12.0 (L) 14.0 - 18.0 g/dL    Hct 35.1 (L) 42.0 - 52.0 %    MCV 90.9 80.0 - 94.0 fL    MCH 31.1 pg    MCHC 34.2 33.0 - 36.0 g/dL    RDW 13.1 11.5 - 17.0 %    Platelet 291 130 -  400 x10(3)/mcL    MPV 10.7 (H) 7.4 - 10.4 fL    Neut % 58.4 %    Lymph % 23.4 %    Mono % 14.1 %    Eos % 3.3 %    Basophil % 0.4 %    Lymph # 1.84 0.6 - 4.6 x10(3)/mcL    Neut # 4.61 2.1 - 9.2 x10(3)/mcL    Mono # 1.11 0.1 - 1.3 x10(3)/mcL    Eos # 0.26 0 - 0.9 x10(3)/mcL    Baso # 0.03 0 - 0.2 x10(3)/mcL    IG# 0.03 0 - 0.04 x10(3)/mcL    IG% 0.4 %    NRBC% 0.0 %   FECAL LEUKOCYTES - LACTOFERRIN ON  STOOL    Collection Time: 03/11/23  7:15 AM   Result Value Ref Range    Fecal Leukocyte Negative Negative    Leukocyte Positive Control Positive Positive    Leukocyte Negative Control Negative Negative   Stool Culture    Collection Time: 03/11/23  7:15 AM    Specimen: Stool   Result Value Ref Range    Stool Culture       Negative for Salmonella, Shigella, Campylobacter, Vibrio, Aeromonas, Pleisiomonas,Yersinia, or Shiga Toxin 1 and 2.   POCT glucose    Collection Time: 03/11/23  8:27 PM   Result Value Ref Range    POCT Glucose 92 70 - 110 mg/dL   POCT glucose    Collection Time: 03/12/23  4:31 AM   Result Value Ref Range    POCT Glucose 95 70 - 110 mg/dL   Basic metabolic panel    Collection Time: 03/12/23  6:30 AM   Result Value Ref Range    Sodium Level 136 136 - 145 mmol/L    Potassium Level 4.0 3.5 - 5.1 mmol/L    Chloride 104 98 - 107 mmol/L    Carbon Dioxide 23 23 - 31 mmol/L    Glucose Level 94 82 - 115 mg/dL    Blood Urea Nitrogen 24.0 8.4 - 25.7 mg/dL    Creatinine 1.20 (H) 0.73 - 1.18 mg/dL    BUN/Creatinine Ratio 20     Calcium Level Total 9.3 8.8 - 10.0 mg/dL    Anion Gap 9.0 mEq/L    eGFR >60 mls/min/1.73/m2   CBC with Differential    Collection Time: 03/12/23  7:15 AM   Result Value Ref Range    WBC 7.9 4.5 - 11.5 x10(3)/mcL    RBC 3.79 (L) 4.70 - 6.10 x10(6)/mcL    Hgb 11.8 (L) 14.0 - 18.0 g/dL    Hct 34.3 (L) 42.0 - 52.0 %    MCV 90.5 80.0 - 94.0 fL    MCH 31.1 pg    MCHC 34.4 33.0 - 36.0 g/dL    RDW 12.9 11.5 - 17.0 %    Platelet 265 130 - 400 x10(3)/mcL    MPV 10.1 7.4 - 10.4 fL    Neut % 55.6 %     Lymph % 27.7 %    Mono % 12.5 %    Eos % 3.0 %    Basophil % 0.4 %    Lymph # 2.18 0.6 - 4.6 x10(3)/mcL    Neut # 4.38 2.1 - 9.2 x10(3)/mcL    Mono # 0.98 0.1 - 1.3 x10(3)/mcL    Eos # 0.24 0 - 0.9 x10(3)/mcL    Baso # 0.03 0 - 0.2 x10(3)/mcL    IG# 0.06 (H) 0 - 0.04 x10(3)/mcL    IG% 0.8 %    NRBC% 0.0 %   PTT Heparin Monitoring    Collection Time: 03/12/23  9:04 AM   Result Value Ref Range    PTT Heparin Monitor 25.2 23.2 - 33.7 seconds   POCT glucose    Collection Time: 03/12/23  9:42 PM   Result Value Ref Range    POCT Glucose 95 70 - 110 mg/dL   POCT glucose    Collection Time: 03/13/23  5:25 AM   Result Value Ref Range    POCT Glucose 102 70 - 110 mg/dL   Basic Metabolic Panel    Collection Time: 03/13/23  5:58 AM   Result Value Ref Range    Sodium Level 135 (L) 136 - 145 mmol/L    Potassium Level 4.4 3.5 - 5.1 mmol/L    Chloride 105 98 - 107 mmol/L    Carbon Dioxide 21 (L) 23 - 31 mmol/L    Glucose Level 99 82 - 115 mg/dL    Blood Urea Nitrogen 17.4 8.4 - 25.7 mg/dL    Creatinine 1.20 (H) 0.73 - 1.18 mg/dL    BUN/Creatinine Ratio 15     Calcium Level Total 8.7 (L) 8.8 - 10.0 mg/dL    Anion Gap 9.0 mEq/L    eGFR >60 mls/min/1.73/m2   CBC with Differential    Collection Time: 03/13/23  5:58 AM   Result Value Ref Range    WBC 8.1 4.5 - 11.5 x10(3)/mcL    RBC 3.74 (L) 4.70 - 6.10 x10(6)/mcL    Hgb 11.7 (L) 14.0 - 18.0 g/dL    Hct 34.8 (L) 42.0 - 52.0 %    MCV 93.0 80.0 - 94.0 fL    MCH 31.3 pg    MCHC 33.6 33.0 - 36.0 g/dL    RDW 12.9 11.5 - 17.0 %    Platelet 321 130 - 400 x10(3)/mcL    MPV 10.1 7.4 - 10.4 fL    Neut % 57.8 %    Lymph % 25.0 %    Mono % 12.9 %    Eos % 3.0 %    Basophil % 0.4 %    Lymph # 2.01 0.6 - 4.6 x10(3)/mcL    Neut # 4.66 2.1 - 9.2 x10(3)/mcL    Mono # 1.04 0.1 - 1.3 x10(3)/mcL    Eos # 0.24 0 - 0.9 x10(3)/mcL    Baso # 0.03 0 - 0.2 x10(3)/mcL    IG# 0.07 (H) 0 - 0.04 x10(3)/mcL    IG% 0.9 %    NRBC% 0.0 %       Significant Imaging:  Imaging Results    None       Physical Exam  HENT:       Mouth/Throat:      Mouth: Mucous membranes are moist.   Cardiovascular:      Rate and Rhythm: Bradycardia present. Rhythm irregular.      Pulses: Normal pulses.      Heart sounds: Normal heart sounds.      Comments: Episodes of 2:1 AVB  Pulmonary:      Effort: Pulmonary effort is normal.      Breath sounds: Normal breath sounds.   Abdominal:      Palpations: Abdomen is soft.   Musculoskeletal:         General: Normal range of motion.   Skin:     General: Skin is warm.      Capillary Refill: Capillary refill takes less than 2 seconds.      Comments: Right forearm noted with extensive ecchymosis. No hematoma to radial site.    Neurological:      General: No focal deficit present.      Mental Status: He is alert.   Psychiatric:         Mood and Affect: Mood normal.     Current Inpatient Medications:    Current Facility-Administered Medications:     acetaminophen tablet 650 mg, 650 mg, Oral, Q4H PRN, Luís Marie MD    aluminum-magnesium hydroxide-simethicone 200-200-20 mg/5 mL suspension 30 mL, 30 mL, Oral, Q6H PRN, Tasia Bangura MD    aspirin EC tablet 81 mg, 81 mg, Oral, Daily, Luís Marie MD, 81 mg at 03/13/23 0919    atorvastatin tablet 40 mg, 40 mg, Oral, QHS, Luís Marie MD, 40 mg at 03/12/23 2141    cetirizine tablet 10 mg, 10 mg, Oral, Daily, Tasia Bangura MD, 10 mg at 03/13/23 0918    hydrALAZINE injection 10 mg, 10 mg, Intravenous, Q4H PRN, Luís Marie MD    HYDROcodone-acetaminophen 5-325 mg per tablet 1 tablet, 1 tablet, Oral, Q4H PRN, Luís Marie MD    losartan tablet 25 mg, 25 mg, Oral, Daily, JAMESON BecerraP, 25 mg at 03/13/23 0918    metoprolol succinate (TOPROL-XL) 24 hr split tablet 12.5 mg, 12.5 mg, Oral, Daily, JAMESON BecerraP, 12.5 mg at 03/13/23 1106    morphine injection 2 mg, 2 mg, Intravenous, Q4H PRN, Luís Marie MD    ondansetron disintegrating tablet 8 mg, 8 mg, Oral, Q8H PRN, Luís Marie MD    pantoprazole EC tablet 40 mg, 40 mg, Oral, Daily, Jame Howell PA-C,  40 mg at 03/13/23 0918    simethicone chewable tablet 80 mg, 1 tablet, Oral, QID (PC + HS), Tasia Bangura MD, 80 mg at 03/13/23 0919    ticagrelor tablet 90 mg, 90 mg, Oral, BID, ERICK Becerra, 90 mg at 03/13/23 0918    tirofiban 12.5 mg in sodium chloride 0.9% 250 mL infusion, , , Continuous PRN, Luís Marie MD, Last Rate: 8 mL/hr at 03/09/23 1203, 0.075 mcg/kg/min at 03/09/23 1203         VTE Risk Mitigation (From admission, onward)      None            Assessment:   Inferior STEMI  Native CAD  --[facility] 3.9.23: OM 2 70-80% long tubular stenosis. % occlusion of proximal PLB. Ostial PDA 80% stenosis. midPDA 50% stenosis and ostial 40-50% stenosis.  --[facility] 3.12.23: OM 1 90% stenosis post PTCA/SEBASTIEN x 3. Had edge dissection at distal edge of stent which was stented in overlapping fashion  AV dissociation  Intermittent episodes of 2:1 AVB  1st degree AVB    Plan:   Continue Brilinta 90 mg bid, aspirin 81 mg daily, and atorvastatin 80 mg daily  Still having episodes of high grade AVB. Will monitor rhythm and have EP evaluate patient  Will keep another 24hr to monitor rhythm  Will have EP see patient today for further recommendations    ERICK Becerra  Cardiology  [facility] -   03/13/2023

## 2023-03-14 VITALS
OXYGEN SATURATION: 93 % | WEIGHT: 196 LBS | TEMPERATURE: 99 F | HEIGHT: 72 IN | SYSTOLIC BLOOD PRESSURE: 120 MMHG | DIASTOLIC BLOOD PRESSURE: 77 MMHG | RESPIRATION RATE: 18 BRPM | BODY MASS INDEX: 26.55 KG/M2 | HEART RATE: 76 BPM

## 2023-03-14 PROBLEM — I21.3 STEMI (ST ELEVATION MYOCARDIAL INFARCTION): Status: ACTIVE | Noted: 2023-03-14

## 2023-03-14 LAB
ANION GAP SERPL CALC-SCNC: 9 MEQ/L
BACTERIA STL CULT: NORMAL
BUN SERPL-MCNC: 12.4 MG/DL (ref 8.4–25.7)
CALCIUM SERPL-MCNC: 9.1 MG/DL (ref 8.8–10)
CHLORIDE SERPL-SCNC: 105 MMOL/L (ref 98–107)
CO2 SERPL-SCNC: 23 MMOL/L (ref 23–31)
CREAT SERPL-MCNC: 1.08 MG/DL (ref 0.73–1.18)
CREAT/UREA NIT SERPL: 11
GFR SERPLBLD CREATININE-BSD FMLA CKD-EPI: >60 MLS/MIN/1.73/M2
GLUCOSE SERPL-MCNC: 97 MG/DL (ref 82–115)
MAGNESIUM SERPL-MCNC: 1.7 MG/DL (ref 1.6–2.6)
PHOSPHATE SERPL-MCNC: 3.5 MG/DL (ref 2.3–4.7)
POCT GLUCOSE: 93 MG/DL (ref 70–110)
POTASSIUM SERPL-SCNC: 4.3 MMOL/L (ref 3.5–5.1)
SODIUM SERPL-SCNC: 137 MMOL/L (ref 136–145)

## 2023-03-14 PROCEDURE — 94761 N-INVAS EAR/PLS OXIMETRY MLT: CPT

## 2023-03-14 PROCEDURE — 84100 ASSAY OF PHOSPHORUS: CPT | Performed by: INTERNAL MEDICINE

## 2023-03-14 PROCEDURE — 93010 ELECTROCARDIOGRAM REPORT: CPT | Mod: ,,, | Performed by: INTERNAL MEDICINE

## 2023-03-14 PROCEDURE — 25000003 PHARM REV CODE 250: Performed by: INTERNAL MEDICINE

## 2023-03-14 PROCEDURE — 25000003 PHARM REV CODE 250: Performed by: PHYSICIAN ASSISTANT

## 2023-03-14 PROCEDURE — 93005 ELECTROCARDIOGRAM TRACING: CPT

## 2023-03-14 PROCEDURE — 83735 ASSAY OF MAGNESIUM: CPT | Performed by: INTERNAL MEDICINE

## 2023-03-14 PROCEDURE — 80048 BASIC METABOLIC PNL TOTAL CA: CPT | Performed by: INTERNAL MEDICINE

## 2023-03-14 PROCEDURE — 93010 EKG 12-LEAD: ICD-10-PCS | Mod: ,,, | Performed by: INTERNAL MEDICINE

## 2023-03-14 PROCEDURE — 25000003 PHARM REV CODE 250: Performed by: NURSE PRACTITIONER

## 2023-03-14 RX ORDER — METOPROLOL SUCCINATE 25 MG/1
12.5 TABLET, EXTENDED RELEASE ORAL DAILY
Qty: 15 TABLET | Refills: 11 | Status: SHIPPED | OUTPATIENT
Start: 2023-03-15 | End: 2024-03-14

## 2023-03-14 RX ORDER — NITROGLYCERIN 0.4 MG/1
0.4 TABLET SUBLINGUAL EVERY 5 MIN PRN
Status: DISCONTINUED | OUTPATIENT
Start: 2023-03-14 | End: 2023-03-14 | Stop reason: HOSPADM

## 2023-03-14 RX ORDER — NITROGLYCERIN 0.4 MG/1
0.4 TABLET SUBLINGUAL EVERY 5 MIN PRN
Qty: 100 TABLET | Refills: 3 | Status: SHIPPED | OUTPATIENT
Start: 2023-03-14 | End: 2024-03-13

## 2023-03-14 RX ORDER — ATORVASTATIN CALCIUM 40 MG/1
40 TABLET, FILM COATED ORAL NIGHTLY
Qty: 90 TABLET | Refills: 3 | Status: SHIPPED | OUTPATIENT
Start: 2023-03-14 | End: 2024-03-13

## 2023-03-14 RX ADMIN — METOPROLOL SUCCINATE 12.5 MG: 25 TABLET, EXTENDED RELEASE ORAL at 08:03

## 2023-03-14 RX ADMIN — SIMETHICONE 80 MG: 80 TABLET, CHEWABLE ORAL at 08:03

## 2023-03-14 RX ADMIN — PANTOPRAZOLE SODIUM 40 MG: 40 TABLET, DELAYED RELEASE ORAL at 08:03

## 2023-03-14 RX ADMIN — ASPIRIN 81 MG: 81 TABLET, COATED ORAL at 08:03

## 2023-03-14 RX ADMIN — LOSARTAN POTASSIUM 25 MG: 25 TABLET, FILM COATED ORAL at 08:03

## 2023-03-14 RX ADMIN — CETIRIZINE HYDROCHLORIDE 10 MG: 10 TABLET, FILM COATED ORAL at 08:03

## 2023-03-14 RX ADMIN — TICAGRELOR 90 MG: 90 TABLET ORAL at 08:03

## 2023-03-14 NOTE — DISCHARGE SUMMARY
[facility] - [facility] Medical Telemetry  Cardiology  Discharge Summary      Patient Name: Reggie Cao  MRN: [id number]  Admission Date: 3/9/2023  Hospital Length of Stay: 5 days  Discharge Date and Time:  03/14/2023 9:14 AM  Attending Physician: Luís Marie MD    Discharging Provider: ERICK Becerra  Primary Care Physician: Tong Escobar MD    HPI:   Mr. Cao is a 78 y/o male,unknown to [facility], who presented to outlying facility due to c/o chest pain worsened with eating. Patient reports esophageal spasms x 1 week with difficulty getting liquids/solids down. EKG obtained revealed inferior STEMI; therefore he was transferred to [facility] for cath lab services. He was brought immediately to cath lab and underwent LHC revealing 100% RPL which was reduced to 0% post PTA/Stent with residual  Patient has been admitted to [facility] for STEMI     Hospital Course:   03.10.23: Patient awake in bed. NAD. VSS. Denies Cp/SOB. Renal indices improving.   3.11.23: EKG revealing AV dissociation. VSS. Renal function has normalized. Dr Whitley reviewed cath- PDA is jailed by stent and is small vessel- unable to treat stenosis. OM is amenable to stenting  3.12.23: Awake in bed. NAD. Noted Wenckebach on tele with HR   3.13.23: Patient s/p LHC yesterday with PCI to OM2. Right radial site benign. Extensive bruising noted to right forearm. No hematoma noted. Having episodes of 2:1 AVB overnight (0100 and 0300). Currently SR with long 1st degree AVB.   3.14.23: Awake in bed. NAD. EKG reviewed, currently SR with 1st degree AVB. Reviewed tele strips overnight- occasional bradycardia during sleep hours.      PMH: HTN  PSH: none  Family History: unknown  Social History: nonsmoker      Procedure(s) (LRB):  ANGIOGRAM, CORONARY ARTERY (N/A)  ANGIOPLASTY, CORONARY ARTERY, WITH STENT INSERTION (N/A)     [facility] 03.12.23:  OM1 lesion 90% stenosis reduced to 0% stenosis post PTA/SEBASTIEN x 3.     [facility] 03.09.23:  Left main:  Large caliber vessel  which bifurcates into the circumflex and LAD.  No significant disease noted.  Intravascular ultrasound showed 10% stenosis circumferentially.  Left anterior descending:  Moderate caliber vessel with luminal irregularities.  Small caliber diagonal branch D1 and D2 noted.  Circumflex:  Moderate caliber vessel.  True circumflex courses down the AV groove which is trivial size.  Om 2 proximally has a 70-80% long tubular stenosis.  RCA:  Large caliber dominant vessel with noted 100% occlusion of the proximal PLB.  The PDA also has an ostial 80-lesion.  Of note, intravascular ultrasound did show midvessel 50% stenosis, an ostial 40-50%   LVEDP:  13 mmHg  Aortic valve:  Stenosis     Intervention:  - RCA:  Proximal PLB-culprit vessel.  2.5 mm balloon angioplasty.  2.5 mm x 32 mm synergy stent.  Post dilated with a 3.5 mm x 8 mm, with IVUS guidance, 3.0 distally and 3.61 mm proximally with serial increasing diameters throughout.  Intracoronary, focal adenosine and nitroglycerin given within the PLB branch. Restoration of ANIYA status from 0 to 3     Impression:  Successful intervention of the 100% occluded proximal PLB   Residual 70-80% proximal OM2 (not intervened due to elevated renal function and contrast up to 150 cc)  Normal Left Ventricular end-diastolic pressure   No aortic stenosis     TTE 03.09.23:  The left ventricle is normal in size with concentric remodeling and low normal systolic function. LVEF 50-55% with inferior wall hypokinesis. Normal left ventricular diastolic function. Normal right ventricular size with normal right ventricular systolic function. Mild mitral regurgitation.    Indwelling Lines/Drains at time of discharge:  Lines/Drains/Airways     None               Physical Exam   HENT:   Mouth/Throat: Mucous membranes are moist.   Cardiovascular: Normal rate, regular rhythm, normal heart sounds and normal pulses. Pulmonary:      Effort: Pulmonary effort is normal.      Breath sounds: Normal breath  sounds.     Abdominal: Soft.   Neurological: He is alert.   Skin: Capillary refill takes less than 2 seconds.   Right FA ecchymosis   Psychiatric: Mood normal.        Significant Diagnostic Studies: Labs:   BMP:   Recent Labs   Lab 03/13/23  0558 03/14/23  0516   * 137   K 4.4 4.3   CO2 21* 23   BUN 17.4 12.4   CREATININE 1.20* 1.08   CALCIUM 8.7* 9.1   MG  --  1.70   , CBC   Recent Labs   Lab 03/13/23  0558   WBC 8.1   HGB 11.7*   HCT 34.8*      , Lipid Panel   Lab Results   Component Value Date    CHOL 167 03/10/2023    HDL 50 03/10/2023    TRIG 125 03/10/2023   , Troponin No results for input(s): TROPONINI in the last 168 hours. and A1C:   Recent Labs   Lab 03/10/23  0515   HGBA1C 5.2     Inferior STEMI  Native CAD  --[facility] 3.9.23: OM 2 70-80% long tubular stenosis. % occlusion of proximal PLB. Ostial PDA 80% stenosis. midPDA 50% stenosis and ostial 40-50% stenosis.  --[facility] 3.12.23: OM 1 90% stenosis post PTCA/SEBASTIEN x 3. Had edge dissection at distal edge of stent which was stented in overlapping fashion  AV dissociation  --rsolved  Intermittent episodes of 2:1 AVB       Plan:   Continue Brilinta 90 mg bid, aspirin 81 mg daily, atorvastatin 80 mg daily, Metoprolol succinate 12.5mg daily and lisinopril 20 mg daily  Currently SR with 1st degree AVB  Bradycardic during sleep hours- recommend outpatient sleep study to assess for sleep apnea    Patient will be returning home to [place]  Establish with cardiologist within 1 week      Discharged Condition: stable    Disposition: Home or Self Care    Follow Up: Establish with cardiologist within one week    Patient Instructions:      Diet Cardiac     Lifting restrictions   Order Comments: No lifting > 10#s x 1 week     Notify your health care provider if you experience any of the following:  temperature >100.4     Notify your health care provider if you experience any of the following:  severe uncontrolled pain     Notify your health care provider if you  experience any of the following:  redness, tenderness, or signs of infection (pain, swelling, redness, odor or green/yellow discharge around incision site)     No dressing needed   Order Comments: Leave site open to air     Shower on day dressing removed (No bath)   Order Comments: Showers only x 1 week     Medications:  Reconciled Home Medications:      Medication List      START taking these medications    atorvastatin 40 MG tablet  Commonly known as: LIPITOR  Take 1 tablet (40 mg total) by mouth every evening. Take at night     metoprolol succinate 25 MG 24 hr tablet  Commonly known as: TOPROL-XL  Take 0.5 tablets (12.5 mg total) by mouth once daily. Hold for HR < 60 or blood pressure < 100  Start taking on: March 15, 2023     ticagrelor 90 mg tablet  Commonly known as: BRILINTA  Take 1 tablet (90 mg total) by mouth 2 (two) times a day.        CONTINUE taking these medications    albuterol 90 mcg/actuation inhaler  Commonly known as: PROVENTIL/VENTOLIN HFA  Inhale 1 puff into the lungs every 4 to 6 hours as needed.     aspirin 81 MG Chew  Take 1 tablet by mouth once daily.     cyanocobalamin 1000 MCG tablet  Commonly known as: VITAMIN B-12  Take 1,000 mcg by mouth once daily.     fenofibrate 160 MG Tab  Take 1 tablet by mouth once daily.     fluticasone propionate 50 mcg/actuation nasal spray  Commonly known as: FLONASE  2 sprays by Each Nostril route once daily.     lisinopriL 20 MG tablet  Commonly known as: PRINIVIL,ZESTRIL  Take 1 tablet by mouth once daily.     pantoprazole 40 MG tablet  Commonly known as: PROTONIX  Take 40 mg by mouth once daily.     predniSONE 10 MG tablet  Commonly known as: DELTASONE  Take 10 mg by mouth once daily.     tiZANidine 4 MG tablet  Commonly known as: ZANAFLEX  Take 4 mg by mouth after meals as needed.     valACYclovir 1000 MG tablet  Commonly known as: VALTREX  Take 1 tablet by mouth once daily.        STOP taking these medications    amLODIPine 10 MG tablet  Commonly known  as: NORVASC            Time spent on the discharge of patient: 35 minutes    Geeta Gibson, JAMESONP  Cardiology  [facility] - [facility] Telemetry

## 2023-03-15 ENCOUNTER — PATIENT OUTREACH (OUTPATIENT)
Dept: ADMINISTRATIVE | Facility: CLINIC | Age: 78
End: 2023-03-15
Payer: MEDICARE

## 2023-03-15 NOTE — PROGRESS NOTES
C3 nurse spoke with Reggie Cao  for a TCC post hospital discharge follow up call. The patient reports a scheduled HOSFU appointment with Tong Escobar MD (Primary Care) on Friday, 3/17/23.

## 2023-04-13 ENCOUNTER — PATIENT MESSAGE (OUTPATIENT)
Dept: ADMINISTRATIVE | Facility: OTHER | Age: 78
End: 2023-04-13
Payer: MEDICARE

## 2023-05-10 ENCOUNTER — APPOINTMENT (RX ONLY)
Dept: URBAN - METROPOLITAN AREA CLINIC 131 | Facility: CLINIC | Age: 78
Setting detail: DERMATOLOGY
End: 2023-05-10

## 2023-05-10 DIAGNOSIS — D22 MELANOCYTIC NEVI: ICD-10-CM

## 2023-05-10 DIAGNOSIS — D49.2 NEOPLASM OF UNSPECIFIED BEHAVIOR OF BONE, SOFT TISSUE, AND SKIN: ICD-10-CM

## 2023-05-10 DIAGNOSIS — L57.8 OTHER SKIN CHANGES DUE TO CHRONIC EXPOSURE TO NONIONIZING RADIATION: ICD-10-CM

## 2023-05-10 DIAGNOSIS — L82.1 OTHER SEBORRHEIC KERATOSIS: ICD-10-CM

## 2023-05-10 DIAGNOSIS — D18.0 HEMANGIOMA: ICD-10-CM

## 2023-05-10 PROBLEM — D18.01 HEMANGIOMA OF SKIN AND SUBCUTANEOUS TISSUE: Status: ACTIVE | Noted: 2023-05-10

## 2023-05-10 PROBLEM — D22.5 MELANOCYTIC NEVI OF TRUNK: Status: ACTIVE | Noted: 2023-05-10

## 2023-05-10 PROCEDURE — 99213 OFFICE O/P EST LOW 20 MIN: CPT | Mod: 25

## 2023-05-10 PROCEDURE — ? SUNSCREEN RECOMMENDATIONS

## 2023-05-10 PROCEDURE — ? BIOPSY BY SHAVE METHOD

## 2023-05-10 PROCEDURE — ? COUNSELING

## 2023-05-10 PROCEDURE — 11102 TANGNTL BX SKIN SINGLE LES: CPT

## 2023-05-10 ASSESSMENT — LOCATION SIMPLE DESCRIPTION DERM
LOCATION SIMPLE: LEFT NOSE
LOCATION SIMPLE: CHEST
LOCATION SIMPLE: RIGHT FOREHEAD

## 2023-05-10 ASSESSMENT — LOCATION DETAILED DESCRIPTION DERM
LOCATION DETAILED: LEFT NARIS
LOCATION DETAILED: RIGHT INFERIOR MEDIAL FOREHEAD
LOCATION DETAILED: RIGHT MEDIAL SUPERIOR CHEST

## 2023-05-10 ASSESSMENT — LOCATION ZONE DERM
LOCATION ZONE: NOSE
LOCATION ZONE: FACE
LOCATION ZONE: TRUNK

## 2023-05-10 NOTE — PROCEDURE: BIOPSY BY SHAVE METHOD
Detail Level: Detailed
Depth Of Biopsy: dermis
Was A Bandage Applied: Yes
Size Of Lesion In Cm: 0
Biopsy Type: H and E
Biopsy Method: 15 blade
Anesthesia Type: 2% lidocaine with epinephrine
Anesthesia Volume In Cc (Will Not Render If 0): 0.5
Hemostasis: Pressure
Wound Care: Vaseline
Dressing: bandage
Destruction After The Procedure: No
Type Of Destruction Used: Curettage
Curettage Text: The wound bed was treated with curettage after the biopsy was performed.
Cryotherapy Text: The wound bed was treated with cryotherapy after the biopsy was performed.
Electrodesiccation Text: The wound bed was treated with electrodesiccation after the biopsy was performed.
Electrodesiccation And Curettage Text: The wound bed was treated with electrodesiccation and curettage after the biopsy was performed.
Silver Nitrate Text: The wound bed was treated with silver nitrate after the biopsy was performed.
Lab: -[id number]
Lab Facility: [facility]
Consent: Written consent was obtained and risks were reviewed including but not limited to scarring, infection, bleeding, scabbing, incomplete removal, nerve damage and allergy to anesthesia.
Post-Care Instructions: I reviewed with the patient in detail post-care instructions. Patient is to keep the biopsy site dry overnight with the bandage on, remove bandage in 24 and apply vaseline twice daily until healed.
Notification Instructions: Patient will be notified of biopsy results. However, patient instructed to call the office if not contacted within 2 weeks.
Billing Type: [employer]
Information: Selecting Yes will display possible errors in your note based on the variables you have selected. This validation is only offered as a suggestion for you. PLEASE NOTE THAT THE VALIDATION TEXT WILL BE REMOVED WHEN YOU FINALIZE YOUR NOTE. IF YOU WANT TO FAX A PRELIMINARY NOTE YOU WILL NEED TO TOGGLE THIS TO 'NO' IF YOU DO NOT WANT IT IN YOUR FAXED NOTE.

## 2023-06-19 PROBLEM — I21.3 STEMI (ST ELEVATION MYOCARDIAL INFARCTION): Status: RESOLVED | Noted: 2023-03-14 | Resolved: 2023-06-19

## 2023-10-12 ENCOUNTER — RX ONLY (OUTPATIENT)
Age: 78
Setting detail: RX ONLY
End: 2023-10-12

## 2023-12-11 ENCOUNTER — COMPREHENSIVE EXAM (OUTPATIENT)
Dept: URBAN - METROPOLITAN AREA CLINIC 35 | Facility: CLINIC | Age: 78
End: 2023-12-11

## 2023-12-11 DIAGNOSIS — H04.123: ICD-10-CM

## 2023-12-11 PROCEDURE — 92014 COMPRE OPH EXAM EST PT 1/>: CPT

## 2023-12-11 ASSESSMENT — TONOMETRY
OS_IOP_MMHG: 13
OD_IOP_MMHG: 16

## 2023-12-11 ASSESSMENT — VISUAL ACUITY
OS_SC: 20/20
OD_SC: J8
OD_SC: 20/20-1
OS_SC: J12

## 2024-05-13 ENCOUNTER — APPOINTMENT (RX ONLY)
Dept: URBAN - METROPOLITAN AREA CLINIC 131 | Facility: CLINIC | Age: 79
Setting detail: DERMATOLOGY
End: 2024-05-13

## 2024-05-13 DIAGNOSIS — D49.2 NEOPLASM OF UNSPECIFIED BEHAVIOR OF BONE, SOFT TISSUE, AND SKIN: ICD-10-CM

## 2024-05-13 DIAGNOSIS — L82.1 OTHER SEBORRHEIC KERATOSIS: ICD-10-CM

## 2024-05-13 DIAGNOSIS — D69.2 OTHER NONTHROMBOCYTOPENIC PURPURA: ICD-10-CM

## 2024-05-13 DIAGNOSIS — L57.8 OTHER SKIN CHANGES DUE TO CHRONIC EXPOSURE TO NONIONIZING RADIATION: ICD-10-CM

## 2024-05-13 DIAGNOSIS — D18.0 HEMANGIOMA: ICD-10-CM

## 2024-05-13 DIAGNOSIS — L57.0 ACTINIC KERATOSIS: ICD-10-CM

## 2024-05-13 PROBLEM — D18.01 HEMANGIOMA OF SKIN AND SUBCUTANEOUS TISSUE: Status: ACTIVE | Noted: 2024-05-13

## 2024-05-13 PROCEDURE — 99213 OFFICE O/P EST LOW 20 MIN: CPT | Mod: 25

## 2024-05-13 PROCEDURE — ? LIQUID NITROGEN

## 2024-05-13 PROCEDURE — ? COUNSELING

## 2024-05-13 PROCEDURE — ? BIOPSY BY SHAVE METHOD

## 2024-05-13 PROCEDURE — ? SUNSCREEN RECOMMENDATIONS

## 2024-05-13 PROCEDURE — 17000 DESTRUCT PREMALG LESION: CPT | Mod: 59

## 2024-05-13 PROCEDURE — 11102 TANGNTL BX SKIN SINGLE LES: CPT

## 2024-05-13 ASSESSMENT — LOCATION DETAILED DESCRIPTION DERM
LOCATION DETAILED: RIGHT DISTAL DORSAL FOREARM
LOCATION DETAILED: INFERIOR THORACIC SPINE
LOCATION DETAILED: STERNUM
LOCATION DETAILED: RIGHT MEDIAL INFERIOR CHEST
LOCATION DETAILED: LEFT DISTAL DORSAL FOREARM
LOCATION DETAILED: RIGHT CENTRAL MALAR CHEEK
LOCATION DETAILED: LEFT SUPERIOR MEDIAL UPPER BACK
LOCATION DETAILED: LEFT DISTAL POSTERIOR UPPER ARM
LOCATION DETAILED: RIGHT PROXIMAL DORSAL FOREARM

## 2024-05-13 ASSESSMENT — LOCATION SIMPLE DESCRIPTION DERM
LOCATION SIMPLE: LEFT UPPER ARM
LOCATION SIMPLE: LEFT UPPER BACK
LOCATION SIMPLE: UPPER BACK
LOCATION SIMPLE: RIGHT CHEEK
LOCATION SIMPLE: RIGHT FOREARM
LOCATION SIMPLE: CHEST
LOCATION SIMPLE: LEFT FOREARM

## 2024-05-13 ASSESSMENT — LOCATION ZONE DERM
LOCATION ZONE: TRUNK
LOCATION ZONE: FACE
LOCATION ZONE: ARM

## 2024-05-13 NOTE — PROCEDURE: LIQUID NITROGEN
Consent: The patient's consent was obtained including but not limited to risks of crusting, scabbing, blistering, scarring, darker or lighter pigmentary change, recurrence, incomplete removal and infection.
Show Applicator Variable?: Yes
Detail Level: Detailed
Render Note In Bullet Format When Appropriate: No
Duration Of Freeze Thaw-Cycle (Seconds): 8
Post-Care Instructions: I reviewed with the patient in detail post-care instructions. Patient is to wear sunprotection, and avoid picking at any of the treated lesions. Pt may apply Vaseline to crusted or scabbing areas.
Number Of Freeze-Thaw Cycles: 2 freeze-thaw cycles

## 2024-05-13 NOTE — PROCEDURE: BIOPSY BY SHAVE METHOD
Detail Level: Detailed
Depth Of Biopsy: dermis
Was A Bandage Applied: Yes
Size Of Lesion In Cm: 0.4
X Size Of Lesion In Cm: 0
Biopsy Type: H and E
Biopsy Method: 15 blade
Anesthesia Type: 2% lidocaine with epinephrine
Anesthesia Volume In Cc: 0.5
Hemostasis: Pressure
Wound Care: Vaseline
Dressing: bandage
Destruction After The Procedure: No
Type Of Destruction Used: Curettage
Curettage Text: The wound bed was treated with curettage after the biopsy was performed.
Cryotherapy Text: The wound bed was treated with cryotherapy after the biopsy was performed.
Electrodesiccation Text: The wound bed was treated with electrodesiccation after the biopsy was performed.
Electrodesiccation And Curettage Text: The wound bed was treated with electrodesiccation and curettage after the biopsy was performed.
Silver Nitrate Text: The wound bed was treated with silver nitrate after the biopsy was performed.
Lab: [id number]
Lab Facility: [id number]
Consent: Written consent was obtained and risks were reviewed including but not limited to scarring, infection, bleeding, scabbing, incomplete removal, nerve damage and allergy to anesthesia.
Post-Care Instructions: I reviewed with the patient in detail post-care instructions. Patient is to keep the biopsy site dry overnight with the bandage on, remove bandage in 24 and apply vaseline twice daily until healed.\\n\\nPateint is verbally notified of woundcare instructions and given written wound care sheet. Pre Op sheet given as well.
Notification Instructions: Patient will be notified of biopsy results. However, patient instructed to call the office if not contacted within 2 weeks.
Billing Type: Third-Party Bill
Information: Selecting Yes will display possible errors in your note based on the variables you have selected. This validation is only offered as a suggestion for you. PLEASE NOTE THAT THE VALIDATION TEXT WILL BE REMOVED WHEN YOU FINALIZE YOUR NOTE. IF YOU WANT TO FAX A PRELIMINARY NOTE YOU WILL NEED TO TOGGLE THIS TO 'NO' IF YOU DO NOT WANT IT IN YOUR FAXED NOTE.

## 2024-09-25 ENCOUNTER — EMERGENCY VISIT (OUTPATIENT)
Dept: URBAN - METROPOLITAN AREA CLINIC 35 | Facility: CLINIC | Age: 79
End: 2024-09-25

## 2024-09-25 DIAGNOSIS — H16.223: ICD-10-CM

## 2024-09-25 DIAGNOSIS — H01.021: ICD-10-CM

## 2024-09-25 DIAGNOSIS — H01.024: ICD-10-CM

## 2024-09-25 DIAGNOSIS — H11.823: ICD-10-CM

## 2024-09-25 PROCEDURE — 99213 OFFICE O/P EST LOW 20 MIN: CPT

## 2024-12-16 ENCOUNTER — COMPREHENSIVE EXAM (OUTPATIENT)
Age: 79
End: 2024-12-16

## 2024-12-16 DIAGNOSIS — H01.024: ICD-10-CM

## 2024-12-16 DIAGNOSIS — H01.021: ICD-10-CM

## 2024-12-16 DIAGNOSIS — H11.823: ICD-10-CM

## 2024-12-16 DIAGNOSIS — H16.223: ICD-10-CM

## 2024-12-16 PROCEDURE — 92014 COMPRE OPH EXAM EST PT 1/>: CPT

## (undated) DEVICE — CATH EMERGE MR 20 X 2.00

## (undated) DEVICE — CATH NC EMERGE MR 3.5X8X143

## (undated) DEVICE — GUIDEWIRE INQWIRE SE 3MM JTIP

## (undated) DEVICE — KIT GLIDESHEATH .021 6FR 16CM

## (undated) DEVICE — BAND TR COMP DEVICE REG 24CM

## (undated) DEVICE — GUIDEWIRE RUNTHROUGH EF 180CM

## (undated) DEVICE — Device

## (undated) DEVICE — PAD DEFIB CADENCE ADULT R2

## (undated) DEVICE — PAD HEARTSTART DEFIB ADULT

## (undated) DEVICE — GUIDE LAUNCHER 6FR JL 4.0

## (undated) DEVICE — SHEATH INTRODUCER 6FR 11CM

## (undated) DEVICE — GUIDE RUNWAY 6FR CLS 3.5

## (undated) DEVICE — CATH EMERGE MR 8 X 3.50

## (undated) DEVICE — CATH EMERGE MR 20 X 2.50

## (undated) DEVICE — DRESSING TRANS 4X4 TEGADERM

## (undated) DEVICE — CANNULA NASAL ADULT

## (undated) DEVICE — GUIDEWIRE RUNTHROUGH NS 180CM

## (undated) DEVICE — CATH OPTITORQUE RADIAL 5FR

## (undated) DEVICE — SET ANGIO ACIST CVI ANGIOTOUCH

## (undated) DEVICE — CATH SASUKE 1.5FR 145CM

## (undated) DEVICE — CATH EAGLE EYE ST .014X20X150